# Patient Record
Sex: FEMALE | Race: WHITE | Employment: OTHER | URBAN - METROPOLITAN AREA
[De-identification: names, ages, dates, MRNs, and addresses within clinical notes are randomized per-mention and may not be internally consistent; named-entity substitution may affect disease eponyms.]

---

## 2018-09-27 DIAGNOSIS — R25.1 TREMOR: Primary | ICD-10-CM

## 2018-09-27 PROBLEM — M35.1 MIXED CONNECTIVE TISSUE DISEASE (HCC): Status: ACTIVE | Noted: 2017-09-06

## 2018-09-27 PROBLEM — M32.9 SLE (SYSTEMIC LUPUS ERYTHEMATOSUS) (HCC): Status: ACTIVE | Noted: 2017-09-06

## 2018-09-27 PROBLEM — T14.8XXA BLISTER: Status: ACTIVE | Noted: 2018-09-06

## 2018-09-27 PROBLEM — M12.529: Status: ACTIVE | Noted: 2018-05-11

## 2018-09-27 PROBLEM — D59.10 AUTOIMMUNE HEMOLYTIC ANEMIA (HCC): Status: ACTIVE | Noted: 2018-09-12

## 2018-09-27 PROBLEM — S72.452P: Status: ACTIVE | Noted: 2017-03-02

## 2018-09-27 PROBLEM — S54.02XA INJURY OF LEFT ULNAR NERVE: Status: ACTIVE | Noted: 2018-05-30

## 2018-09-27 PROBLEM — I73.00 RAYNAUD'S DISEASE WITHOUT GANGRENE: Status: ACTIVE | Noted: 2017-06-01

## 2018-09-27 PROBLEM — L98.422 CHRONIC NON-PRESSURE ULCER OF SACRUM EXTENDING TO FAT LEVEL (HCC): Status: ACTIVE | Noted: 2018-09-12

## 2018-10-09 ENCOUNTER — OFFICE VISIT (OUTPATIENT)
Dept: INFECTIOUS DISEASES | Age: 63
End: 2018-10-09
Payer: COMMERCIAL

## 2018-10-09 VITALS
HEART RATE: 86 BPM | RESPIRATION RATE: 16 BRPM | BODY MASS INDEX: 42.11 KG/M2 | OXYGEN SATURATION: 95 % | SYSTOLIC BLOOD PRESSURE: 132 MMHG | HEIGHT: 66 IN | DIASTOLIC BLOOD PRESSURE: 71 MMHG | WEIGHT: 262 LBS

## 2018-10-09 DIAGNOSIS — M32.19 OTHER SYSTEMIC LUPUS ERYTHEMATOSUS WITH OTHER ORGAN INVOLVEMENT (HCC): Primary | ICD-10-CM

## 2018-10-09 DIAGNOSIS — T14.8XXA OPEN WOUND: ICD-10-CM

## 2018-10-09 PROCEDURE — 99203 OFFICE O/P NEW LOW 30 MIN: CPT | Performed by: INTERNAL MEDICINE

## 2018-10-09 RX ORDER — PREDNISONE 20 MG/1
TABLET ORAL
COMMUNITY
Start: 2017-06-14

## 2018-10-09 RX ORDER — GENTAMICIN SULFATE 1 MG/G
OINTMENT TOPICAL
COMMUNITY
Start: 2018-09-30

## 2018-10-09 RX ORDER — FESOTERODINE FUMARATE 8 MG/1
8 TABLET, EXTENDED RELEASE ORAL
COMMUNITY
Start: 2016-05-20

## 2018-10-09 RX ORDER — NITROFURANTOIN 25; 75 MG/1; MG/1
500 CAPSULE ORAL
COMMUNITY

## 2018-10-09 RX ORDER — TIZANIDINE 4 MG/1
4 TABLET ORAL
COMMUNITY
Start: 2018-05-08

## 2018-10-09 NOTE — PROGRESS NOTES
 Peripheral neuropathy 2016    Psychogenic dysuria 2016    Raynaud's disease without gangrene 2017    SLE (systemic lupus erythematosus) ([facility] [address] 2017    Thyroid disease     hypothyroid    Traumatic arthropathy of elbow 2018    Tremor 2016       Past Surgical  History:     Past Surgical History:   Procedure Laterality Date    ABDOMINOPLASTY  2014    BRACHIOPLASTY Bilateral 2014    BREAST SURGERY Bilateral 2013    BILATERAL BREAST LIFT WITH EXCISION OPF AXILLA REDUNDANT TISSUE      SECTION      COSMETIC SURGERY      FRACTURE SURGERY Left     compound fx of humerus and reconstruction of elbow    TONSILLECTOMY      TUBAL LIGATION         Medications:     Current Outpatient Prescriptions:     predniSONE (DELTASONE) 20 MG tablet, take 1 tablet by mouth twice a day, Disp: , Rfl:     tiZANidine (ZANAFLEX) 4 MG tablet, Take 4 mg by mouth, Disp: , Rfl:     Fesoterodine Fumarate ER (TOVIAZ) 8 MG TB24, Take 8 mg by mouth, Disp: , Rfl:     nitrofurantoin, macrocrystal-monohydrate, (MACROBID) 100 MG capsule, Take 500 mg by mouth, Disp: , Rfl:     gentamicin (GARAMYCIN) 0.1 % ointment, APPLY 1 APPLICATION TOPICALLY 3 TIMES DAILY TO WOUNDS AND BLISTERS, COVER WITH GAUZE, Disp: , Rfl:     PROMOGRAN ALETA WOUND DRESSING MATRIX, Apply to ulcerated areas BID, Disp: 5 Package, Rfl: 1    Multiple Vitamins-Minerals (THERAPEUTIC MULTIVITAMIN-MINERALS) tablet, Take 1 tablet by mouth daily. , Disp: , Rfl:     HYDROcodone-acetaminophen (NORCO) 5-325 MG per tablet, Take 1 tablet by mouth every 4 hours as needed for Pain., Disp: 40 tablet, Rfl: 0    scopolamine (TRANSDERM-SCOP) 1.5 MG, Bring patch with you day of procedure, Disp: 1 patch, Rfl: 0    metoclopramide (REGLAN) 10 MG tablet, Take am of procedure with small sip of water, Disp: 1 tablet, Rfl: 0    famotidine (PEPCID) 20 MG tablet, Take am of procedure with sip of water, Disp: 1 tablet, Rfl: 0  

## 2018-10-15 ENCOUNTER — TELEPHONE (OUTPATIENT)
Dept: INFECTIOUS DISEASES | Age: 63
End: 2018-10-15

## 2018-10-24 PROBLEM — S31.829D: Status: ACTIVE | Noted: 2018-10-24

## 2018-10-24 NOTE — PROGRESS NOTES
[facility] - Office Progress Note  Today's Date and Time: 10/25/2018, 5:53 PM    Diagnostic Impression :     1. Systemic lupus erythematosus (SLE) with pericarditis, unspecified SLE type (HCC)    2. Pyoderma gangrenosum    3. Open wound of buttock without complication, left, subsequent encounter    4. Non-healing ulcer of multiple sites, limited to breakdown of skin ([address]      Recommendations   · Awaiting wound biopsy taken on 10/21/2018 to exclude pyoderma gangrenosum  · If pyoderma gangrenosum is found on biopsy, she will require higher levels of steroids and potentially additional immunosuppressive medications  such as biologic immune modifiers. · Will discuss case with Dr. Judit Moss, Plastic surgery, once biopsy results are available. Pt has an appointment with him on Wednesday, 10/31/2018 at 4pm  · Continue with Dakin's solution dressing changes for now; increase to 3 times a day   · Request for increasing wound care to 3 times a day; external referral faxed to [address] [place] [address] on [place].  · For her wound care, patient requires: Biatain silicone 9B8GU, [employer] Abd pad sterile 5x9in, Biatain alginate 4x4in, cloth tape, 4x4 gauze, [employer]rique [employer] nephew no sting skin prep wipes ([id number]) and medicine cotton tipped applicators (ref- [id number])     Chief complaint/reason for consultation:     Chief Complaint   Patient presents with    Follow-up     other systemic lupus erythematosus with other organ involvement     History of Present Illness:   Ashu Dobbins is a 57y.o.-year-old  female who was initially evaluated on 10/9/2018. Patient seen at the request of .       INITIAL HISTORY:     Patient indicated that she developed a facial rash and periodic fevers for several months. She had an extensive workup with the eventual diagnosis of SLE being made about a year ago.     Since then she has been taking prednisone with control of symptoms.  However, she has  Autoimmune hemolytic anemia (HCC) 2018    Blister 2018    Chronic non-pressure ulcer of sacrum extending to fat level ([facility] [address] 2018    Closed comminuted supracondylar fracture of left femur with malunion 3/2/2017    Closed wedge compression fracture of seventh thoracic vertebra ([address] 2016    Depression     Eczema 2016    Gastroesophageal reflux disease 2016    Hyperlipidemia     Hypothyroidism 2016    Injury of left ulnar nerve 2018    Migraine 2016    Mixed anxiety depressive disorder 2016    Mixed connective tissue disease ([address] 2017    Morbid obesity ([address] 2016    Neuropathy     secondary to spine disease    Osteoarthritis of cervical spine 2016    Osteoarthritis of elbow 2016    Osteoarthritis of lumbar spine 2016    Other plastic surgery for unacceptable cosmetic appearance 2014    Peripheral neuropathy 2016    Psychogenic dysuria 2016    Pyoderma gangrenosum     Raynaud's disease without gangrene 2017    SLE (systemic lupus erythematosus) ([facility] [address] 2017    Thyroid disease     hypothyroid    Traumatic arthropathy of elbow 2018    Tremor 2016     Past Surgical  History:     Past Surgical History:   Procedure Laterality Date    ABDOMINOPLASTY  2014    BRACHIOPLASTY Bilateral 2014    BREAST SURGERY Bilateral 2013    BILATERAL BREAST LIFT WITH EXCISION OPF AXILLA REDUNDANT TISSUE      SECTION      COSMETIC SURGERY      FRACTURE SURGERY Left     compound fx of humerus and reconstruction of elbow    TONSILLECTOMY      TUBAL LIGATION       Medications:     Current Outpatient Prescriptions:     predniSONE (DELTASONE) 20 MG tablet, take 1 tablet by mouth twice a day, Disp: , Rfl:     tiZANidine (ZANAFLEX) 4 MG tablet, Take 4 mg by mouth, Disp: , Rfl:     Fesoterodine Fumarate ER (TOVIAZ) 8 MG TB24, Take 8 mg by mouth, Disp: , Rfl:     nitrofurantoin,

## 2018-10-25 ENCOUNTER — OFFICE VISIT (OUTPATIENT)
Dept: INFECTIOUS DISEASES | Age: 63
End: 2018-10-25
Payer: COMMERCIAL

## 2018-10-25 VITALS
DIASTOLIC BLOOD PRESSURE: 76 MMHG | SYSTOLIC BLOOD PRESSURE: 137 MMHG | OXYGEN SATURATION: 97 % | HEIGHT: 66 IN | BODY MASS INDEX: 42.11 KG/M2 | RESPIRATION RATE: 14 BRPM | WEIGHT: 262 LBS | HEART RATE: 85 BPM

## 2018-10-25 DIAGNOSIS — L88 PYODERMA GANGRENOSUM: ICD-10-CM

## 2018-10-25 DIAGNOSIS — M32.12 SYSTEMIC LUPUS ERYTHEMATOSUS (SLE) WITH PERICARDITIS, UNSPECIFIED SLE TYPE (HCC): Primary | ICD-10-CM

## 2018-10-25 DIAGNOSIS — L98.491: ICD-10-CM

## 2018-10-25 DIAGNOSIS — S31.829D: ICD-10-CM

## 2018-10-25 PROCEDURE — 99214 OFFICE O/P EST MOD 30 MIN: CPT | Performed by: INTERNAL MEDICINE

## 2018-10-25 NOTE — LETTER
[facility] [address]  [address] [facility],[address] [address]  Phone: [phone]  Fax: [phone]    PCP: Adrian Grajeda MD   CC providers:  Adrian Grajeda MD  [address]  [address]  [address] [place]  [address]  [address] [address]  [address] [facility]       October 25, 2018       Patient: Deshaun Torres   MR Number: [id number]   YOB: 1955   Date of Visit: 10/25/2018     Dear Eduard Wilcox: Thank you for allowing me to see your patient Ms. Deshaun Torres. Below are the relevant portions of my assessment and plan of care. [facility] - Office Progress Note  Today's Date and Time: 10/25/2018, 4:58 PM    Diagnostic Impression :     1. Systemic lupus erythematosus (SLE) with pericarditis, unspecified SLE type ([facility] [address]    2. Open wound of buttock without complication, left, subsequent encounter      Recommendations ·   Awaiting wound biopsy taken on 10/21/2018 to exclude pyoderma gangrenosum  · If pyoderma gangrenosum is found on biopsy, she will require higher levels of steroids and potentially additional immunosuppressive medications  such as biologic immune modifiers. · Will discuss case with Dr. Vaughn Agrawal, Plastic surgery, once biopsy results are available.  Pt has an appointment with him on Wednesday, 10/31/2018 at 4pm  · Continue with Dakin's solution dressing changes for now; increase to 3 times a day   · Request for increasing wound care to 3 times a day; external referral faxed to [address] [place] [address] on [place].  · For her wound care, patient requires: Biatain silicone 6O4IM, [employer] Abd pad sterile 5x9in, Biatain alginate 4x4in, cloth tape, 4x4 gauze, [employer]rique [employer] nephew no sting skin prep wipes ([id number]) and medicine cotton tipped applicators (ref- [id number])     Chief complaint/reason for consultation:     Chief Complaint   Patient presents with    Follow-up · For her wound care, patient requires: Biatain silicone 6F4CU, [employer] Abd pad sterile 5x9in, Biatain alginate 4x4in, cloth tape, 4x4 gauze, Enrique [employer] nephew no sting skin prep wipes ([id number]) and medicine cotton tipped applicators (ref- [id number])         Discussed with patient, RN, family. I have personally reviewed the past medical history, past surgical history, medications, social history, and family history, and I have updated the database accordingly.   Past Medical History:     Past Medical History:   Diagnosis Date    Anemia 5/30/2016    Asthma     Atopic rhinitis 5/30/2016    Autoimmune hemolytic anemia ([address] 9/12/2018    Blister 9/6/2018    Chronic non-pressure ulcer of sacrum extending to fat level ([address] 9/12/2018    Closed comminuted supracondylar fracture of left femur with malunion 3/2/2017    Closed wedge compression fracture of seventh thoracic vertebra ([address] 5/30/2016    Depression     Eczema 5/30/2016    Gastroesophageal reflux disease 5/30/2016    Hyperlipidemia     Hypothyroidism 5/30/2016    Injury of left ulnar nerve 5/30/2018    Migraine 5/30/2016    Mixed anxiety depressive disorder 5/30/2016    Mixed connective tissue disease ([address] 9/6/2017    Morbid obesity ([address] 5/30/2016    Neuropathy     secondary to spine disease    Osteoarthritis of cervical spine 5/30/2016    Osteoarthritis of elbow 5/30/2016    Osteoarthritis of lumbar spine 5/30/2016    Other plastic surgery for unacceptable cosmetic appearance 1/13/2014    Peripheral neuropathy 5/30/2016    Psychogenic dysuria 5/30/2016    Pyoderma gangrenosum     Raynaud's disease without gangrene 6/1/2017    SLE (systemic lupus erythematosus) ([address] 9/6/2017    Thyroid disease     hypothyroid    Traumatic arthropathy of elbow 5/11/2018    Tremor 5/30/2016     Past Surgical  History:     Past Surgical History:   Procedure Laterality Date    ABDOMINOPLASTY  5/1/2014    BRACHIOPLASTY Bilateral 5/1/2014

## 2018-10-30 ENCOUNTER — TELEPHONE (OUTPATIENT)
Dept: INFECTIOUS DISEASES | Age: 63
End: 2018-10-30

## 2018-11-26 PROBLEM — L88 PYODERMA GANGRENOSUM: Status: ACTIVE | Noted: 2018-11-26

## 2018-11-26 NOTE — PROGRESS NOTES
TONSILLECTOMY      TUBAL LIGATION         Medications:     Current Outpatient Prescriptions:     predniSONE (DELTASONE) 20 MG tablet, take 1 tablet by mouth twice a day, Disp: , Rfl:     tiZANidine (ZANAFLEX) 4 MG tablet, Take 4 mg by mouth, Disp: , Rfl:     Fesoterodine Fumarate ER (TOVIAZ) 8 MG TB24, Take 8 mg by mouth, Disp: , Rfl:     nitrofurantoin, macrocrystal-monohydrate, (MACROBID) 100 MG capsule, Take 500 mg by mouth, Disp: , Rfl:     gentamicin (GARAMYCIN) 0.1 % ointment, APPLY 1 APPLICATION TOPICALLY 3 TIMES DAILY TO WOUNDS AND BLISTERS, COVER WITH GAUZE, Disp: , Rfl:     PROMOGRAN ALETA WOUND DRESSING MATRIX, Apply to ulcerated areas BID, Disp: 5 Package, Rfl: 1    Multiple Vitamins-Minerals (THERAPEUTIC MULTIVITAMIN-MINERALS) tablet, Take 1 tablet by mouth daily. , Disp: , Rfl:     HYDROcodone-acetaminophen (NORCO) 5-325 MG per tablet, Take 1 tablet by mouth every 4 hours as needed for Pain., Disp: 40 tablet, Rfl: 0    scopolamine (TRANSDERM-SCOP) 1.5 MG, Bring patch with you day of procedure, Disp: 1 patch, Rfl: 0    metoclopramide (REGLAN) 10 MG tablet, Take am of procedure with small sip of water, Disp: 1 tablet, Rfl: 0    famotidine (PEPCID) 20 MG tablet, Take am of procedure with sip of water, Disp: 1 tablet, Rfl: 0    amoxicillin-clavulanate (AUGMENTIN) 875-125 MG per tablet, , Disp: , Rfl:     ondansetron (ZOFRAN) 4 MG tablet, Take 1 tablet by mouth every 8 hours as needed for Nausea for 15 doses. , Disp: 15 tablet, Rfl: 2    fluticasone-salmeterol (ADVAIR) 250-50 MCG/DOSE AEPB, Inhale 1 puff into the lungs every 12 hours. Take as directed, Disp: , Rfl:     buPROPion (WELLBUTRIN XL) 150 MG XL tablet, Take 150 mg by mouth every morning., Disp: , Rfl:     clonazePAM (KLONOPIN) 1 MG tablet, Take 1 mg by mouth daily. , Disp: , Rfl:     pregabalin (LYRICA) 100 MG capsule, Take 100 mg by mouth 2 times daily.  225mg daily, Disp: , Rfl:     naproxen (NAPROSYN) 500 MG tablet, Take 500 mg General Appearance: Awake, alert, and in no apparent distress  Head:  Normocephalic, no trauma  Eyes: Pupils equal, round, reactive, to light and accommodation; extraocular movements intact; sclera anicteric; conjunctivae pink. No embolic phenomena. ENT: Oropharynx clear, without erythema, exudate, or thrush. No tenderness of sinuses. Mouth/throat: mucosa pink and moist. No lesions. Dentition in good repair. Neck:Supple, without lymphadenopathy. Thyroid normal, No bruits. Pulmonary/Chest: Clear to auscultation, without wheezes, rales, or rhonchi. No dullness to percussion. Cardiovascular: Regular rate and rhythm without murmurs, rubs, or gallops. Abdomen: Soft, non tender. Bowel sounds normal. No organomegaly  All four Extremities: No cyanosis, clubbing, edema, or effusions. Neurologic: No gross sensory or motor deficits. Skin: Warm and dry with good turgor. No signs of peripheral arterial or venous insufficiency. Medical Decision Making:   I have independently reviewed/ordered the following labs:    CBC with Differential:   Lab Results   Component Value Date    WBC 8.5 04/23/2014    HGB 13.9 04/23/2014    HCT 40.8 04/23/2014     04/23/2014     BMP:   Lab Results   Component Value Date     04/23/2014    K 3.8 04/23/2014    CL 97 04/23/2014    CO2 25 04/23/2014     Lab Results   Component Value Date    RBC 4.43 04/23/2014    WBC 8.5 04/23/2014     No results found for: CREATININE, GLUCOSE, KETONES    Thank you for allowing us to participate in the care of this patient. Please call with questions.     Sampson Vaughn MD  Pager: [phone] - Office: [phone]

## 2018-11-27 ENCOUNTER — OFFICE VISIT (OUTPATIENT)
Dept: INFECTIOUS DISEASES | Age: 63
End: 2018-11-27

## 2018-11-27 DIAGNOSIS — F41.8 MIXED ANXIETY DEPRESSIVE DISORDER: ICD-10-CM

## 2018-11-27 DIAGNOSIS — S31.829D: Primary | ICD-10-CM

## 2018-11-27 DIAGNOSIS — D59.10 AUTOIMMUNE HEMOLYTIC ANEMIA (HCC): ICD-10-CM

## 2018-11-27 DIAGNOSIS — M35.1 MIXED CONNECTIVE TISSUE DISEASE (HCC): ICD-10-CM

## 2018-11-27 DIAGNOSIS — L88 PYODERMA GANGRENOSUM: ICD-10-CM

## 2018-11-27 DIAGNOSIS — M32.12 SYSTEMIC LUPUS ERYTHEMATOSUS (SLE) WITH PERICARDITIS, UNSPECIFIED SLE TYPE (HCC): ICD-10-CM

## 2018-11-27 DIAGNOSIS — E66.01 MORBID OBESITY (HCC): ICD-10-CM

## 2019-12-10 PROBLEM — Z41.1 ELECTIVE PROCEDURE FOR UNACCEPTABLE COSMETIC APPEARANCE: Status: ACTIVE | Noted: 2019-12-10

## 2020-03-12 LAB — DIABETIC RETINOPATHY: NEGATIVE

## 2022-08-30 ENCOUNTER — APPOINTMENT (OUTPATIENT)
Dept: CT IMAGING | Facility: CLINIC | Age: 67
End: 2022-08-30
Payer: MEDICARE

## 2022-08-30 ENCOUNTER — HOSPITAL ENCOUNTER (EMERGENCY)
Facility: CLINIC | Age: 67
Discharge: HOME | End: 2022-08-30
Attending: EMERGENCY MEDICINE
Payer: MEDICARE

## 2022-08-30 VITALS
HEART RATE: 82 BPM | SYSTOLIC BLOOD PRESSURE: 162 MMHG | BODY MASS INDEX: 36 KG/M2 | HEIGHT: 66 IN | OXYGEN SATURATION: 95 % | TEMPERATURE: 99 F | DIASTOLIC BLOOD PRESSURE: 77 MMHG | RESPIRATION RATE: 16 BRPM | WEIGHT: 224 LBS

## 2022-08-30 DIAGNOSIS — U07.1 COVID: Primary | ICD-10-CM

## 2022-08-30 LAB
ABSOLUTE EOS #: 0 K/UL (ref 0–0.4)
ABSOLUTE LYMPH #: 0.6 K/UL (ref 1–4.8)
ABSOLUTE MONO #: 0.4 K/UL (ref 0.1–1.2)
ALBUMIN SERPL-MCNC: 3.7 G/DL (ref 3.5–5.2)
ALBUMIN/GLOBULIN RATIO: 1.5 (ref 1–2.5)
ALP BLD-CCNC: 61 U/L (ref 35–104)
ALT SERPL-CCNC: 6 U/L (ref 5–33)
ANION GAP SERPL CALCULATED.3IONS-SCNC: 13 MMOL/L (ref 9–17)
AST SERPL-CCNC: 10 U/L
BASOPHILS # BLD: 0 % (ref 0–2)
BASOPHILS ABSOLUTE: 0 K/UL (ref 0–0.2)
BILIRUB SERPL-MCNC: 0.4 MG/DL (ref 0.3–1.2)
BUN BLDV-MCNC: 10 MG/DL (ref 8–23)
CALCIUM SERPL-MCNC: 8.7 MG/DL (ref 8.6–10.4)
CHLORIDE BLD-SCNC: 99 MMOL/L (ref 98–107)
CO2: 21 MMOL/L (ref 20–31)
CREAT SERPL-MCNC: 0.6 MG/DL (ref 0.5–0.9)
D-DIMER QUANTITATIVE: 2.15 MG/L FEU
EOSINOPHILS RELATIVE PERCENT: 0 % (ref 1–4)
GFR AFRICAN AMERICAN: >60 ML/MIN
GFR NON-AFRICAN AMERICAN: >60 ML/MIN
GFR SERPL CREATININE-BSD FRML MDRD: ABNORMAL ML/MIN/{1.73_M2}
GLUCOSE BLD-MCNC: 110 MG/DL (ref 70–99)
HCT VFR BLD CALC: 29.2 % (ref 36–46)
HEMOGLOBIN: 9.7 G/DL (ref 12–16)
LYMPHOCYTES # BLD: 12 % (ref 24–44)
MCH RBC QN AUTO: 30.8 PG (ref 26–34)
MCHC RBC AUTO-ENTMCNC: 33.3 G/DL (ref 31–37)
MCV RBC AUTO: 92.7 FL (ref 80–100)
MONOCYTES # BLD: 8 % (ref 2–11)
PDW BLD-RTO: 15.3 % (ref 12.5–15.4)
PLATELET # BLD: 223 K/UL (ref 140–450)
PMV BLD AUTO: 6.4 FL (ref 6–12)
POTASSIUM SERPL-SCNC: 3.4 MMOL/L (ref 3.7–5.3)
RBC # BLD: 3.15 M/UL (ref 4–5.2)
SEG NEUTROPHILS: 80 % (ref 36–66)
SEGMENTED NEUTROPHILS ABSOLUTE COUNT: 3.6 K/UL (ref 1.8–7.7)
SODIUM BLD-SCNC: 133 MMOL/L (ref 135–144)
TOTAL PROTEIN: 6.2 G/DL (ref 6.4–8.3)
TROPONIN, HIGH SENSITIVITY: 23 NG/L (ref 0–14)
WBC # BLD: 4.5 K/UL (ref 3.5–11)

## 2022-08-30 PROCEDURE — 6370000000 HC RX 637 (ALT 250 FOR IP): Performed by: EMERGENCY MEDICINE

## 2022-08-30 PROCEDURE — 93005 ELECTROCARDIOGRAM TRACING: CPT | Performed by: EMERGENCY MEDICINE

## 2022-08-30 PROCEDURE — 84484 ASSAY OF TROPONIN QUANT: CPT

## 2022-08-30 PROCEDURE — 36415 COLL VENOUS BLD VENIPUNCTURE: CPT

## 2022-08-30 PROCEDURE — 2580000003 HC RX 258: Performed by: EMERGENCY MEDICINE

## 2022-08-30 PROCEDURE — 85025 COMPLETE CBC W/AUTO DIFF WBC: CPT

## 2022-08-30 PROCEDURE — 99285 EMERGENCY DEPT VISIT HI MDM: CPT

## 2022-08-30 PROCEDURE — 6360000004 HC RX CONTRAST MEDICATION: Performed by: EMERGENCY MEDICINE

## 2022-08-30 PROCEDURE — 80053 COMPREHEN METABOLIC PANEL: CPT

## 2022-08-30 PROCEDURE — 71260 CT THORAX DX C+: CPT | Performed by: EMERGENCY MEDICINE

## 2022-08-30 PROCEDURE — 85379 FIBRIN DEGRADATION QUANT: CPT

## 2022-08-30 RX ORDER — BENZONATATE 100 MG/1
100 CAPSULE ORAL 3 TIMES DAILY PRN
Qty: 30 CAPSULE | Refills: 0 | Status: SHIPPED | OUTPATIENT
Start: 2022-08-30 | End: 2022-09-06

## 2022-08-30 RX ORDER — ALBUTEROL SULFATE 90 UG/1
2 AEROSOL, METERED RESPIRATORY (INHALATION) ONCE
Status: COMPLETED | OUTPATIENT
Start: 2022-08-30 | End: 2022-08-30

## 2022-08-30 RX ORDER — 0.9 % SODIUM CHLORIDE 0.9 %
1000 INTRAVENOUS SOLUTION INTRAVENOUS ONCE
Status: COMPLETED | OUTPATIENT
Start: 2022-08-30 | End: 2022-08-30

## 2022-08-30 RX ORDER — ACETAMINOPHEN 500 MG
1000 TABLET ORAL ONCE
Status: COMPLETED | OUTPATIENT
Start: 2022-08-30 | End: 2022-08-30

## 2022-08-30 RX ORDER — 0.9 % SODIUM CHLORIDE 0.9 %
70 INTRAVENOUS SOLUTION INTRAVENOUS ONCE
Status: COMPLETED | OUTPATIENT
Start: 2022-08-30 | End: 2022-08-30

## 2022-08-30 RX ORDER — BENZONATATE 100 MG/1
100 CAPSULE ORAL ONCE
Status: COMPLETED | OUTPATIENT
Start: 2022-08-30 | End: 2022-08-30

## 2022-08-30 RX ADMIN — SODIUM CHLORIDE 70 ML: 9 INJECTION, SOLUTION INTRAVENOUS at 21:28

## 2022-08-30 RX ADMIN — IOPAMIDOL 75 ML: 755 INJECTION, SOLUTION INTRAVENOUS at 21:28

## 2022-08-30 RX ADMIN — ALBUTEROL SULFATE 2 PUFF: 108 AEROSOL, METERED RESPIRATORY (INHALATION) at 20:00

## 2022-08-30 RX ADMIN — SODIUM CHLORIDE 1000 ML: 9 INJECTION, SOLUTION INTRAVENOUS at 20:08

## 2022-08-30 RX ADMIN — ACETAMINOPHEN 1000 MG: 500 TABLET ORAL at 20:00

## 2022-08-30 RX ADMIN — BENZONATATE 100 MG: 100 CAPSULE ORAL at 20:00

## 2022-08-30 NOTE — ED PROVIDER NOTES
Reason For Visit  ARIANNE RODRIGUEZ is an established patient here today for a follow-up management of constipation.   :  services not used.   A chaperone is not applicable. He is unaccompanied.        Quality    Adult Wellness CI height documented, discussion of regular exercise, exercising regularly, printed information given for activities, discussion of nutritional quality of diet, patient education given about proper diet, alcohol use, not having considered quitting drinking, not getting angry when talked to about drinking, not having a drink or two in the morning to get going, not drinking alcohol regularly, and feeling guilty about it, no tobacco use, does not have feelings of hopelessness (PHQ-2), no Anhedonia (PHQ-2), monitoring patient and pain scale level reviewed.      History of Present Illness  Patient attempted to try MiraLAX. Minimal relief. Patient continues to have change in caliber of stool more small in caliber. Patient feels as though he may have obstruction of his lower GI tract. Patient's right upper quadrant abdominal pain has mildly improved.      Review of Systems    All other systems reviewed and negative.      Current Meds   1. Butalbital-APAP-Caff-Cod -40-30 MG Oral Capsule;   Therapy: 12Dec2017 to (Evaluate:01Jan2018); Last Rx:[id number] Ordered   2. CoQ10 200 MG Oral Capsule; 1 TABLET TWICE DAILY;   Therapy: 12Dec2017 to (Evaluate:12Mar2018)  Requested for: 12Dec2017; Last   Rx:12Dec2017 Ordered   3. L-Carnitine 500 MG Oral Tablet; 2 Tablets by mouth twice daily;   Therapy: [id number] to (Evaluate:12Mar2018)  Requested for: [id number]; Last   Rx:[id number] Ordered   4. Naproxen 500 MG Oral Tablet; TAKE 1 TABLET EVERY 12 HOURS AS NEEDED FOR   PAIN with food;   Therapy: 06Nov2018 to (Evaluate:05Jan2019)  Requested for: 06Nov2018; Last   Rx:06Nov2018 Ordered   5. Ondansetron 4 MG Oral Tablet Disintegrating; Take one- two tablet on tongue every 8    hours allow to  [facility]  [facility]  Phone: [phone]      Pt Name: Nahomi Metzger  [id number]  [place] 1955  Date of evaluation: 8/30/2022      CHIEF COMPLAINT       Chief Complaint   Patient presents with    Cough    Positive For Covid-19       HISTORY OF PRESENT ILLNESS   Nahomi Metzger is a 74 y.o. female who presents for evaluation for a persistent cough, fever and upper respiratory symptoms. The patient reports that she developed a nonproductive cough, sore throat, myalgias, fatigue, and intermittent fever 13 days ago and tested positive for COVID the same day. She states that she had a few days where she felt improved and thought that she had gotten over the infection. The patient called her PCP after approximately 1 week of symptoms and requested to be placed on Paxil of it but she did not qualify because she was over 5 days into her course. The patient has been taking Tylenol for fever but has not taken this for several days. She has been taking cough drops and over-the-counter cough medication for her she states that she was concerned that she was still having symptoms today which prompted her to call EMS and have her brought to the emergency department. The patient does not list any provoking or palliating factors. She is a poor historian regarding her past medical history. The patient denies vision changes, headache, neck pain, back pain, chest pain, shortness of breath, dizziness, lightheadedness, nausea, vomiting, urinary/bowel symptoms, focal weakness, numbness, tingling, recent injury or illness.     REVIEW OF SYSTEMS     Ten point review of systems was reviewed and is negative unless otherwise noted in the HPI    [address]    has a past medical history of Anemia, Asthma, Atopic rhinitis, Autoimmune hemolytic anemia ([address] Blister, Chronic non-pressure ulcer of sacrum extending to fat level ([address] Closed comminuted supracondylar fracture of left dissolve then swallow as needed for nausea  Requested for: 06Nov2018;   Last Rx:06Nov2018 Ordered   6. Polyethylene Glycol 3350 Oral Powder; MIX 1 CAPFUL IN 8 OUNCES OF WATER AND   DRINK AT BEDTIME AS NEEDED FOR CONSTIPATION;   Therapy: 06Nov2018 to (Evaluate:01Nov2019)  Requested for: 06Nov2018; Last   Rx:06Nov2018 Ordered   7. TraMADol HCl - 50 MG Oral Tablet; TAKE 1 TABLET BY MOUTH EVERY 12 HOURS AS   NEEDED FOR PAIN;   Therapy: 06Nov2018 to (Evaluate:16Nov2018); Last Rx:06Nov2018 Ordered    Active Problems  Colicky right upper quadrant pain (R10.11)  Constipation (K59.00)  Gallstones (K80.20)  Migraine with typical aura (G43.109)  Need for influenza vaccination (Z23)    Past Medical History  History of attention deficit disorder (Z86.59)  History of bruxism    Surgical History  no history of surgery    Family History  Mother   No pertinent family history  Father   No pertinent family history  Brother   Family history of migraine headaches (Z82.0)    Social History  Minimum alcohol consumption  Monogamous relationship  Never smoker  No illicit drug use  Occupation   · 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      Vitals  Signs   Recorded: 09Nov2018 03:22PM   Height: 5 ft 9 in  Weight: 196 lb   BMI Calculated: 28.94  BSA Calculated: 2.05  Systolic: 120  Diastolic: 60  Temperature: 97.1 F  Heart Rate: 59  Respiration: 17  O2 Saturation: 98  Pain Scale: 00    Physical Exam  Constitutional: alert, in no acute distress and current vital signs reviewed.   Head and Face: atraumatic, no deformities, normocephalic, normal facies.   Eyes: no discharge, normal conjunctiva, no eyelid swelling, no ptosis and the sclerae were normal. pupils equal, round and reactive to light and accommodation, conjugate gaze and extraocular movements were intact.   Pulmonary: no respiratory distress, normal  femur with malunion, Closed wedge compression fracture of seventh thoracic vertebra (HCC), Depression, Eczema, Gastroesophageal reflux disease, Hyperlipidemia, Hypothyroidism, Injury of left ulnar nerve, Migraine, Mixed anxiety depressive disorder, Mixed connective tissue disease ([address] Morbid obesity ([address] Neuropathy, Osteoarthritis of cervical spine, Osteoarthritis of elbow, Osteoarthritis of lumbar spine, Other plastic surgery for unacceptable cosmetic appearance, Peripheral neuropathy, Psychogenic dysuria, Pyoderma gangrenosum, Raynaud's disease without gangrene, SLE (systemic lupus erythematosus) ([address] Thyroid disease, Traumatic arthropathy of elbow, and Tremor. SURGICAL HISTORY      has a past surgical history that includes Tonsillectomy; fracture surgery (Left, );  section; Tubal ligation; Breast surgery (Bilateral, 2013); Cosmetic surgery; Abdominoplasty (2014); and Brachioplasty (Bilateral, 2014). CURRENT MEDICATIONS       Discharge Medication List as of 2022 10:19 PM        CONTINUE these medications which have NOT CHANGED    Details   predniSONE (DELTASONE) 20 MG tablet take 1 tablet by mouth twice a dayHistorical Med      tiZANidine (ZANAFLEX) 4 MG tablet Take 4 mg by mouthHistorical Med      Fesoterodine Fumarate ER (TOVIAZ) 8 MG TB24 Take 8 mg by mouthHistorical Med      nitrofurantoin, macrocrystal-monohydrate, (MACROBID) 100 MG capsule Take 500 mg by mouthHistorical Med      gentamicin (GARAMYCIN) 0.1 % ointment APPLY 1 APPLICATION TOPICALLY 3 TIMES DAILY TO WOUNDS AND BLISTERS, COVER WITH GAUZE, Historical Med      PROMOGRAN ALETA WOUND DRESSING MATRIX Disp-5 Package, R-1, PrintApply to ulcerated areas BID      Multiple Vitamins-Minerals (THERAPEUTIC MULTIVITAMIN-MINERALS) tablet Take 1 tablet by mouth daily.       HYDROcodone-acetaminophen (NORCO) 5-325 MG per tablet Take 1 tablet by mouth every 4 hours as needed for Pain., Disp-40 tablet, R-0      scopolamine (TRANSDERM-SCOP) 1.5 MG Bring patch with you day of procedure, Disp-1 patch, R-0      metoclopramide (REGLAN) 10 MG tablet Take am of procedure with small sip of water, Disp-1 tablet, R-0      famotidine (PEPCID) 20 MG tablet Take am of procedure with sip of water, Disp-1 tablet, R-0      amoxicillin-clavulanate (AUGMENTIN) 875-125 MG per tablet Historical Med      ondansetron (ZOFRAN) 4 MG tablet Take 1 tablet by mouth every 8 hours as needed for Nausea for 15 doses. , Disp-15 tablet, R-2      fluticasone-salmeterol (ADVAIR) 250-50 MCG/DOSE AEPB Inhale 1 puff into the lungs every 12 hours. Take as directed      buPROPion (WELLBUTRIN XL) 150 MG XL tablet Take 150 mg by mouth every morning. clonazePAM (KLONOPIN) 1 MG tablet Take 1 mg by mouth daily. pregabalin (LYRICA) 100 MG capsule Take 100 mg by mouth 2 times daily. 225mg daily      naproxen (NAPROSYN) 500 MG tablet Take 500 mg by mouth as needed for Pain. PARoxetine (PAXIL) 40 MG tablet Take 40 mg by mouth every morning. 2 tablets by mouth in the morning      primidone (MYSOLINE) 50 MG tablet Take 50 mg by mouth daily. 3 tablets by mouth daily      montelukast (SINGULAIR) 10 MG tablet Take 10 mg by mouth as needed. levothyroxine (SYNTHROID) 175 MCG tablet Take 175 mcg by mouth Daily. topiramate (TOPAMAX) 25 MG tablet Take 25 mg by mouth 2 times daily. 50 mg every 12 hours             ALLERGIES     has No Known Allergies. FAMILY HISTORY     has no family status information on file. family history is not on file. SOCIAL HISTORY      reports that she has never smoked. She has never used smokeless tobacco. She reports current alcohol use. She reports that she does not use drugs. PHYSICAL EXAM     INITIAL VITALS:  height is 5' 6\" (1.676 m) and weight is 101.6 kg (224 lb). Her temperature is 99 °F (37.2 °C). Her blood pressure is 162/77 (abnormal) and her pulse is 82. Her respiration is 16 and oxygen saturation is 95%. respiratory rate and effort and no accessory muscle use. breath sounds clear to auscultation bilaterally.   Cardiovascular: normal rate, no murmurs were heard, regular rhythm, normal S1 and normal S2. edema was not present in the lower extremities.   Abdomen: soft, nondistended, normal bowel sounds and no abdominal mass . Mild tenderness with palpation of right upper quadrant and left lower quadrant. no hepatomegaly and no splenomegaly. no umbilical hernia was discovered. the rectum was normal. had no hard area or nodule.      Immunizations  Influenza --- Series1: 12-Dec-2017  (33y)     Assessment  Colicky right upper quadrant pain (R10.11)  Gallstones (K80.20)  Constipation (K59.00)  Change in stool caliber (R19.4)    Plan  CT ABDOMEN AND PELVIS W CON; Status:Active - Perform Order; Requested  for:09Nov2018;   Gastroenterology Referral/Consult Treatment and Evaluation    Status: Active  Requested  for: 09Nov2018  Care Summary provided. : Yes  Plans:     Plan:   Follow-up with GI and general surgeon have abdominal ultrasound and CT performed.   Follow-up in the office as needed.   Counseled on alcohol usage & associated health risks & benefits.    Counseled on illicit drug usage & associated health risks.    Counseled on tobacco education.    Medical compliance with plan discussed and risks of non-compliance reviewed.    Patient education completed on disease process, etiology & prognosis.    Patient expresses understanding of the plan.    Proper usage and side effects of medications reviewed & discussed.    Refer to orders.    Return to clinic as clinically indicated as discussed with patient who verbalized understanding of & agreement with the plan.    Written handout given.      Discussion/Summary  Colicky right upper quadrant abdominal pain with positive gallstones on ultrasound  Repeat ultrasound to rule out obstruction  Symptomatic gallstones  Refer patient to general surgeon and repeat ultrasound  Labs  CONSTITUTIONAL: no apparent distress, well appearing  SKIN: warm, dry, no jaundice, hives or petechiae  EYES: clear conjunctiva, non-icteric sclera  HENT: normocephalic, atraumatic, moist mucus membranes, Posterior oropharynx is clear without any erythema, edema, exudate or asymmetry. Uvula midline. No trismus or malocclusion. No pain to palpation over the frontal or maxillary sinuses. No mastoid tenderness. Able to handle secretions. Normal speech. Patent airway. No submental swelling or evidence of cellulitis. NECK: Nontender and supple with no nuchal rigidity, full range of motion  PULMONARY: clear to auscultation without wheezes, rhonchi, or rales, normal excursion, no accessory muscle use and no stridor  CARDIOVASCULAR: tachycardiac rate, regular rhythm. Strong radial pulses with intact distal perfusion. Capillary refill <2 seconds. GASTROINTESTINAL: soft, non-tender, non-distended, no palpable masses, no rebound or guarding   GENITOURINARY: No costovertebral angle tenderness to palpation  MUSCULOSKELETAL: No midline spinal tenderness, step off or deformity. Extremities are otherwise nontender to palpation and nonerythematous. Compartments soft. No peripheral edema. NEUROLOGIC: alert and oriented x 3, GCS 15, normal mentation and speech.  Moves all extremities x 4 without motor or sensory deficit, gait is stable without ataxia  PSYCHIATRIC: normal mood and affect, thought process is clear and linear    DIAGNOSTIC RESULTS     EKG:  EKG 1957 sinus tachycardia, rate 103 bpm, leftward axis, normal intervals, no ST elevation or depression, T wave inversion in 3, good R wave progression, Q wave in aVR and 3, no previous EKG for comparison    RADIOLOGY:   CT CHEST PULMONARY EMBOLISM W CONTRAST    Result Date: 8/30/2022  EXAMINATION: CTA OF THE CHEST 8/30/2022 9:08 pm TECHNIQUE: CTA of the chest was performed after the administration of intravenous contrast.  Multiplanar reformatted images are provided for unremarkable.  Negative rebound or rigidity negative acute abdomen  Return to clinic if symptoms worsen or do not improve  Tramadol for severe pain  Naproxen as needed for pain    Constipation with stool caliber change in left lower abdominal pain  No improvement after stopping Reglan as well as using MiraLAX  Rectal exam unremarkable.  Will perform CT to rule out any mass  Follow-up with GI for colonoscopy patient   Zofran as needed for nausea  MiraLAX as needed for constipation      will not be charged for today's visit since. Since patient was recently seen in symptoms have not improved.         Signatures   Electronically signed by : Janette Lyle, ; Nov 9 2018  3:20PM CST (Co-author)    Electronically signed by : JESENIA BERG D.O.; Nov 9 2018  3:48PM CST     review. MIP images are provided for review. Automated exposure control, iterative reconstruction, and/or weight based adjustment of the mA/kV was utilized to reduce the radiation dose to as low as reasonably achievable. COMPARISON: None. HISTORY: ORDERING SYSTEM PROVIDED HISTORY: shortness of breath, tachy, elevated d-dimer, COVID positive TECHNOLOGIST PROVIDED HISTORY: shortness of breath, tachy, elevated d-dimer, COVID positive Decision Support Exception - unselect if not a suspected or confirmed emergency medical condition->Emergency Medical Condition (MA) Reason for Exam: COVID positive. D dimer 2.15. Pt could not raise her left arm up for scan. Had difficulty also following breathing instructions FINDINGS: Pulmonary Arteries: Pulmonary arteries are adequately opacified for evaluation. No evidence of intraluminal filling defect to suggest pulmonary embolism. Main pulmonary artery is normal in caliber. Mediastinum: No evidence of mediastinal lymphadenopathy. The heart and pericardium demonstrate no acute abnormality. There is no acute abnormality of the thoracic aorta. Small axial hiatal hernia. Lungs/pleura: Scattered areas of ground-glass densities noted throughout both lungs, most likely suggestive of moderate burden of multifocal pneumonia as seen with COVID. No pulmonary edema. No evidence of pleural effusion or pneumothorax. Upper Abdomen: Limited images of the upper abdomen are unremarkable. Soft Tissues/Bones: Old fracture deformity of posterior aspect of right 8 rib. No other acute bone or soft tissue abnormality. No evidence of pulmonary embolism. Scattered areas of ground-glass densities noted throughout both lungs, most likely suggestive of moderate burden of multifocal pneumonia as seen with COVID. Small axial hiatal hernia.  RECOMMENDATIONS: Unavailable        LABS:  Results for orders placed or performed during the hospital encounter of 08/30/22   CBC with Auto Differential   Result Value Ref Range    WBC 4.5 3.5 - 11.0 k/uL    RBC 3.15 (L) 4.0 - 5.2 m/uL    Hemoglobin 9.7 (L) 12.0 - 16.0 g/dL    Hematocrit 29.2 (L) 36 - 46 %    MCV 92.7 80 - 100 fL    MCH 30.8 26 - 34 pg    MCHC 33.3 31 - 37 g/dL    RDW 15.3 12.5 - 15.4 %    Platelets 485 333 - 420 k/uL    MPV 6.4 6.0 - 12.0 fL    Seg Neutrophils 80 (H) 36 - 66 %    Lymphocytes 12 (L) 24 - 44 %    Monocytes 8 2 - 11 %    Eosinophils % 0 (L) 1 - 4 %    Basophils 0 0 - 2 %    Segs Absolute 3.60 1.8 - 7.7 k/uL    Absolute Lymph # 0.60 (L) 1.0 - 4.8 k/uL    Absolute Mono # 0.40 0.1 - 1.2 k/uL    Absolute Eos # 0.00 0.0 - 0.4 k/uL    Basophils Absolute 0.00 0.0 - 0.2 k/uL   D-Dimer, Quantitative   Result Value Ref Range    D-Dimer, Quant 2.15 mg/L FEU   Troponin   Result Value Ref Range    Troponin, High Sensitivity 23 (H) 0 - 14 ng/L   Comprehensive Metabolic Panel   Result Value Ref Range    Glucose 110 (H) 70 - 99 mg/dL    BUN 10 8 - 23 mg/dL    Creatinine 0.60 0.50 - 0.90 mg/dL    Calcium 8.7 8.6 - 10.4 mg/dL    Sodium 133 (L) 135 - 144 mmol/L    Potassium 3.4 (L) 3.7 - 5.3 mmol/L    Chloride 99 98 - 107 mmol/L    CO2 21 20 - 31 mmol/L    Anion Gap 13 9 - 17 mmol/L    Alkaline Phosphatase 61 35 - 104 U/L    ALT 6 5 - 33 U/L    AST 10 <32 U/L    Total Bilirubin 0.40 0.3 - 1.2 mg/dL    Total Protein 6.2 (L) 6.4 - 8.3 g/dL    Albumin 3.7 3.5 - 5.2 g/dL    Albumin/Globulin Ratio 1.5 1.0 - 2.5    GFR Non-African American >60 >60 mL/min    GFR African American >60 >60 mL/min    GFR Comment             EMERGENCY DEPARTMENT COURSE:        The patient was given the following medications:  Orders Placed This Encounter   Medications    0.9 % sodium chloride bolus    albuterol sulfate HFA (PROVENTIL;VENTOLIN;PROAIR) 108 (90 Base) MCG/ACT inhaler 2 puff     Order Specific Question:   Initiate RT Bronchodilator Protocol     Answer:   Yes - ED protocol    benzonatate (TESSALON) capsule 100 mg    acetaminophen (TYLENOL) tablet 1,000 mg    0.9 % sodium chloride bolus    iopamidol (ISOVUE-370) 76 % injection 75 mL    benzonatate (TESSALON PERLES) 100 MG capsule     Sig: Take 1 capsule by mouth 3 times daily as needed for Cough     Dispense:  30 capsule     Refill:  0        Vitals:    Vitals:    08/30/22 1906 08/30/22 2218 08/30/22 2239   BP: (!) 177/76 (!) 162/77    Pulse: (!) 107 82    Resp: 16     Temp: (!) 102.1 °F (38.9 °C) 99 °F (37.2 °C)    TempSrc: Oral  Oral   SpO2: 95%     Weight: 101.6 kg (224 lb)     Height: 5' 6\" (1.676 m)       -------------------------  BP: (!) 162/77, Temp: 99 °F (37.2 °C), Heart Rate: 82, Resp: 16    CONSULTS:  None    CRITICAL CARE:   None    PROCEDURES:  None    DIAGNOSIS/ MDM:   Yolande Sorensen is a 74 y.o. female who presents with persistent after being diagnosed with COVID 13 days ago. She arrives febrile and tachycardic. She defervesced with Tylenol and heart rate improved with IV fluids. Her cough improved with Tessalon Perles and a albuterol inhaler. Exam is grossly unremarkable other than tachycardia. CBC shows normocytic anemia with hemoglobin of 9.7. Blood work from [employer] [facility] was reviewed and this appears to be her baseline hemoglobin. CMP is unremarkable. Troponin is 23 which only slightly elevated and this is consistent with her troponin levels in the past.  D-dimer is elevated but CT pulmonary embolism study shows no evidence of PE.  CT scan does show scattered areas of groundglass densities noted throughout both lungs that is most suggestive of moderate burden of multifocal pneumonia as seen with COVID. She also has a small axial hiatal hernia. The patient overall improved with treatment. I have low suspicion for ACS, PE, dissection, pneumothorax, cardiac tamponade, esophageal rupture, or sepsis. I instructed the patient to place a humidifier in her room and try eating honey to help with her sore throat. I provided her with a albuterol inhaler to take home with a spacer and she was prescribed Tessalon Perles. She was instructed to follow-up with her PCP in 1 to 2 days and to return to the ER for worsening symptoms or any other concern. I did recommend that she get a home pulse oximeter to monitor her home oxygen. She was given return precautions. The patient understands that at this time there is no evidence for a more malignant underlying process, but also understands that early in the process of an illness or injury, an emergency department work-up can be falsely reassuring. Routine discharge counseling was given, and the patient understands that worsening, changing or persistent symptoms should prompt a immediate call or follow-up with their primary care physician or return to the emergency department. The importance of appropriate follow-up was also discussed. I have reviewed the disposition diagnosis with the patient. I have answered their questions and given discharge instructions. They voiced understanding of these instructions and did not have any further questions or complaints.       FINAL IMPRESSION      1. COVID          DISPOSITION/PLAN   DISPOSITION Decision To Discharge 08/30/2022 10:18:27 PM        PATIENT REFERRED TO:  Yvette Jessica MD  [address]  [phone]    Schedule an appointment as soon as possible for a visit in 2 days      [facility]  [address] [place] [address],[facility] [address] [phone]  Go to   If symptoms worsen    DISCHARGE MEDICATIONS:  Discharge Medication List as of 8/30/2022 10:19 PM        START taking these medications    Details   benzonatate (TESSALON PERLES) 100 MG capsule Take 1 capsule by mouth 3 times daily as needed for Cough, Disp-30 capsule, R-0Normal             (Please note that portions of this note were completed with a voice recognitionprogram.  Efforts were made to edit the dictations but occasionally words are mis-transcribed.)    Ana Dodge DO, [facility]  Emergency Physician Attending          Ana Dodge DO  08/31/22 7344

## 2022-08-31 ENCOUNTER — CARE COORDINATION (OUTPATIENT)
Dept: CARE COORDINATION | Age: 67
End: 2022-08-31

## 2022-08-31 LAB
EKG ATRIAL RATE: 103 BPM
EKG P AXIS: 44 DEGREES
EKG P-R INTERVAL: 134 MS
EKG Q-T INTERVAL: 330 MS
EKG QRS DURATION: 90 MS
EKG QTC CALCULATION (BAZETT): 432 MS
EKG R AXIS: -21 DEGREES
EKG T AXIS: 17 DEGREES
EKG VENTRICULAR RATE: 103 BPM

## 2022-08-31 NOTE — DISCHARGE INSTRUCTIONS
Return to the Emergency Department immediately if you develop worsening shortness of breath, fatigue, chest pain, or passing out. Follow up with your primary care doctor by calling the office tomorrow    Prevention steps for  People with confirmed or suspected COVID-19 (including persons under investigation) who do not need to be hospitalized  and   People with confirmed COVID-19 who were hospitalized and determined to be medically stable to go home  Your healthcare provider and public health staff will evaluate whether you can be cared for at home. If it is determined that you do not need to be hospitalized and can be isolated at home, you will be monitored by staff from your local or state health department. You should follow the prevention steps below until a healthcare provider or local or state health department says you can return to your normal activities. Stay home except to get medical care  People who are mildly ill with COVID-19 are able to isolate at home during their illness. You should restrict activities outside your home, except for getting medical care. Do not go to work, school, or public areas. Avoid using public transportation, ride-sharing, or taxis. Separate yourself from other people and animals in your home  People: As much as possible, you should stay in a specific room and away from other people in your home. Also, you should use a separate bathroom, if available. Animals: You should restrict contact with pets and other animals while you are sick with COVID-19, just like you would around other people. Although there have not been reports of pets or other animals becoming sick with COVID-19, it is still recommended that people sick with COVID-19 limit contact with animals until more information is known about the virus. When possible, have another member of your household care for your animals while you are sick.  If you are sick with COVID-19, avoid contact with your pet, including petting, snuggling, being kissed or licked, and sharing food. If you must care for your pet or be around animals while you are sick, wash your hands before and after you interact with pets and wear a facemask. See COVID-19 and Animals for more information. Call ahead before visiting your doctor  If you have a medical appointment, call the healthcare provider and tell them that you have or may have COVID-19. This will help the healthcare providers office take steps to keep other people from getting infected or exposed. Wear a facemask  You should wear a facemask when you are around other people (e.g., sharing a room or vehicle) or pets and before you enter a healthcare providers office. If you are not able to wear a facemask (for example, because it causes trouble breathing), then people who live with you should not stay in the same room with you, or they should wear a facemask if they enter your room. Cover your coughs and sneezes  Cover your mouth and nose with a tissue when you cough or sneeze. Throw used tissues in a lined trash can. Immediately wash your hands with soap and water for at least 20 seconds or, if soap and water are not available, clean your hands with an alcohol-based hand  that contains at least 60% alcohol. Clean your hands often  Wash your hands often with soap and water for at least 20 seconds, especially after blowing your nose, coughing, or sneezing; going to the bathroom; and before eating or preparing food. If soap and water are not readily available, use an alcohol-based hand  with at least 60% alcohol, covering all surfaces of your hands and rubbing them together until they feel dry. Soap and water are the best option if hands are visibly dirty. Avoid touching your eyes, nose, and mouth with unwashed hands.   Avoid sharing personal household items  You should not share dishes, drinking glasses, cups, eating utensils, towels, or bedding with other people or pets in your home. After using these items, they should be washed thoroughly with soap and water. Clean all high-touch surfaces everyday  High touch surfaces include counters, tabletops, doorknobs, bathroom fixtures, toilets, phones, keyboards, tablets, and bedside tables. Also, clean any surfaces that may have blood, stool, or body fluids on them. Use a household cleaning spray or wipe, according to the label instructions. Labels contain instructions for safe and effective use of the cleaning product including precautions you should take when applying the product, such as wearing gloves and making sure you have good ventilation during use of the product. Monitor your symptoms  Seek prompt medical attention if your illness is worsening (e.g., difficulty breathing). Before seeking care, call your healthcare provider and tell them that you have, or are being evaluated for, COVID-19. Put on a facemask before you enter the facility. These steps will help the healthcare providers office to keep other people in the office or waiting room from getting infected or exposed. Ask your healthcare provider to call the local or [facility] health department. Persons who are placed under active monitoring or facilitated self-monitoring should follow instructions provided by their local health department or occupational health professionals, as appropriate. When working with your local health department check their available hours. If you have a medical emergency and need to call 911, notify the dispatch personnel that you have, or are being evaluated for COVID-19. If possible, put on a facemask before emergency medical services arrive. Discontinuing home isolation  Patients with confirmed COVID-19 should remain under home isolation precautions until the risk of secondary transmission to others is thought to be low.  The decision to discontinue home isolation precautions should be made on a case-by-case basis, in consultation with healthcare providers and state and local health departments. [employer].  Local [place] departments:  Judson Lee [phone] [address] [facility]  Alivia Malone [phone]  [facility]  [address] [facility]  [address]  [address] [phone]  Kannan Kunz [phone]  You can call [phone] or [phone] 24 hours a day. Recommended precautions for household members, intimate partners, and caregivers in a nonhealthcare setting1 of  A patient with symptomatic laboratory-confirmed COVID-19  or  A patient under investigation  Household members, intimate partners, and caregivers in a nonhealthcare setting may have close contact2 with a person with symptomatic, laboratory-confirmed COVID-19 or a person under investigation. Close contacts should monitor their health; they should call their healthcare provider right away if they develop symptoms suggestive of COVID-19 (e.g., fever, cough, shortness of breath) (see Interim [place] Guidance for Risk Assessment and Public Health Management of Persons with Potential Coronavirus Disease 2019 (COVID-19) Exposure in Travel-associated or [employer].)  Close contacts should also follow these recommendations:  Make sure that you understand and can help the patient follow their healthcare providers instructions for medication(s) and care. You should help the patient with basic needs in the home and provide support for getting groceries, prescriptions, and other personal needs. Monitor the patients symptoms. If the patient is getting sicker, call his or her healthcare provider and tell them that the patient has laboratory-confirmed COVID-19. This will help the healthcare providers office take steps to keep other people in the office or waiting room from getting infected.  Ask the healthcare provider to call the local or state health department for additional guidance. If the patient has a medical emergency and you need to call 911, notify the dispatch personnel that the patient has, or is being evaluated for COVID-19. Household members should stay in another room or be  from the patient as much as possible. Household members should use a separate bedroom and bathroom, if available. Prohibit visitors who do not have an essential need to be in the home. Household members should care for any pets in the home. Do not handle pets or other animals while sick. For more information, see COVID-19 and Animals. Make sure that shared spaces in the home have good air flow, such as by an air conditioner or an opened window, weather permitting. Perform hand hygiene frequently. Wash your hands often with soap and water for at least 20 seconds or use an alcohol-based hand  that contains 60 to 95% alcohol, covering all surfaces of your hands and rubbing them together until they feel dry. Soap and water should be used preferentially if hands are visibly dirty. Avoid touching your eyes, nose, and mouth with unwashed hands. The patient should wear a facemask when you are around other people. If the patient is not able to wear a facemask (for example, because it causes trouble breathing), you, as the caregiver, should wear a mask when you are in the same room as the patient. Wear a disposable facemask and gloves when you touch or have contact with the patients blood, stool, or body fluids, such as saliva, sputum, nasal mucus, vomit, urine. Throw out disposable facemasks and gloves after using them. Do not reuse. When removing personal protective equipment, first remove and dispose of gloves. Then, immediately clean your hands with soap and water or alcohol-based hand . Next, remove and dispose of facemask, and immediately clean your hands again with soap and water or alcohol-based hand .   Avoid sharing household items with the patient. You should not share dishes, drinking glasses, cups, eating utensils, towels, bedding, or other items. After the patient uses these items, you should wash them thoroughly (see below [employer]). Clean all high-touch surfaces, such as counters, tabletops, doorknobs, bathroom fixtures, toilets, phones, keyboards, tablets, and bedside tables, every day. Also, clean any surfaces that may have blood, stool, or body fluids on them. Use a household cleaning spray or wipe, according to the label instructions. Labels contain instructions for safe and effective use of the cleaning product including precautions you should take when applying the product, such as wearing gloves and making sure you have good ventilation during use of the product. [address] thoroughly. Immediately remove and wash clothes or bedding that have blood, stool, or body fluids on them. Wear disposable gloves while handling soiled items and keep soiled items away from your body. Clean your hands (with soap and water or an alcohol-based hand ) immediately after removing your gloves. Read and follow directions on labels of laundry or clothing items and detergent. In general, using a normal laundry detergent according to washing machine instructions and dry thoroughly using the warmest temperatures recommended on the clothing label. Place all used disposable gloves, facemasks, and other contaminated items in a lined container before disposing of them with other household waste. Clean your hands (with soap and water or an alcohol-based hand ) immediately after handling these items. Soap and water should be used preferentially if hands are visibly dirty. Discuss any additional questions with your state or local health department or healthcare provider. Check available hours when contacting your local health department.

## 2022-08-31 NOTE — ED NOTES
Pt brought in by squad c/o cough and midsternal chest pain when she coughs. She is covid positive. Reports the cough is getting progressively worse. Reports she has been to her PCP but they did not prescribe her anything for the cough. Pt alert and oriented.       Minor Leiva RN  08/30/22 0594

## 2023-02-24 ENCOUNTER — HOSPITAL ENCOUNTER (OUTPATIENT)
Age: 68
Setting detail: SPECIMEN
Discharge: HOME | End: 2023-02-24

## 2023-02-24 LAB
ABSOLUTE EOS #: 0.09 K/UL (ref 0–0.44)
ABSOLUTE EOS #: 0.09 K/UL (ref 0–0.44)
ABSOLUTE IMMATURE GRANULOCYTE: <0.03 K/UL (ref 0–0.3)
ABSOLUTE IMMATURE GRANULOCYTE: <0.03 K/UL (ref 0–0.3)
ABSOLUTE LYMPH #: 0.78 K/UL (ref 1.1–3.7)
ABSOLUTE LYMPH #: 0.85 K/UL (ref 1.1–3.7)
ABSOLUTE MONO #: 0.42 K/UL (ref 0.1–1.2)
ABSOLUTE MONO #: 0.45 K/UL (ref 0.1–1.2)
ALBUMIN SERPL-MCNC: 4.1 G/DL (ref 3.5–5.2)
ALBUMIN SERPL-MCNC: 4.2 G/DL (ref 3.5–5.2)
ALBUMIN SERPL-MCNC: 4.4 G/DL (ref 3.5–5.2)
ALBUMIN/GLOBULIN RATIO: 1.9 (ref 1–2.5)
ALBUMIN/GLOBULIN RATIO: 2.1 (ref 1–2.5)
ALBUMIN/GLOBULIN RATIO: 3.4 (ref 1–2.5)
ALP SERPL-CCNC: 59 U/L (ref 35–104)
ALP SERPL-CCNC: 60 U/L (ref 35–104)
ALP SERPL-CCNC: 62 U/L (ref 35–104)
ALT SERPL-CCNC: 11 U/L (ref 5–33)
ANION GAP SERPL CALCULATED.3IONS-SCNC: 15 MMOL/L (ref 9–17)
ANION GAP SERPL CALCULATED.3IONS-SCNC: 16 MMOL/L (ref 9–17)
AST SERPL-CCNC: 12 U/L
AST SERPL-CCNC: 13 U/L
AST SERPL-CCNC: 14 U/L
BASOPHILS # BLD: 1 % (ref 0–2)
BASOPHILS # BLD: 1 % (ref 0–2)
BASOPHILS ABSOLUTE: 0.03 K/UL (ref 0–0.2)
BASOPHILS ABSOLUTE: 0.03 K/UL (ref 0–0.2)
BILIRUB DIRECT SERPL-MCNC: 0.1 MG/DL
BILIRUB INDIRECT SERPL-MCNC: 0.1 MG/DL (ref 0–1)
BILIRUB SERPL-MCNC: 0.2 MG/DL (ref 0.3–1.2)
BILIRUB SERPL-MCNC: 0.2 MG/DL (ref 0.3–1.2)
BILIRUB SERPL-MCNC: 0.3 MG/DL (ref 0.3–1.2)
BUN SERPL-MCNC: 13 MG/DL (ref 8–23)
BUN SERPL-MCNC: 13 MG/DL (ref 8–23)
CALCIUM SERPL-MCNC: 9.5 MG/DL (ref 8.6–10.4)
CALCIUM SERPL-MCNC: 9.6 MG/DL (ref 8.6–10.4)
CHLORIDE SERPL-SCNC: 106 MMOL/L (ref 98–107)
CHLORIDE SERPL-SCNC: 107 MMOL/L (ref 98–107)
CO2 SERPL-SCNC: 24 MMOL/L (ref 20–31)
CO2 SERPL-SCNC: 24 MMOL/L (ref 20–31)
COMPLEMENT C3: 134 MG/DL (ref 90–180)
COMPLEMENT C4: 8 MG/DL (ref 10–40)
CREAT SERPL-MCNC: 0.73 MG/DL (ref 0.5–0.9)
CREAT SERPL-MCNC: 0.76 MG/DL (ref 0.5–0.9)
CRP SERPL HS-MCNC: 10.6 MG/L (ref 0–5)
EOSINOPHILS RELATIVE PERCENT: 2 % (ref 1–4)
EOSINOPHILS RELATIVE PERCENT: 2 % (ref 1–4)
FOLATE SERPL-MCNC: >20 NG/ML
GFR SERPL CREATININE-BSD FRML MDRD: >60 ML/MIN/1.73M2
GFR SERPL CREATININE-BSD FRML MDRD: >60 ML/MIN/1.73M2
GLUCOSE SERPL-MCNC: 90 MG/DL (ref 70–99)
GLUCOSE SERPL-MCNC: 90 MG/DL (ref 70–99)
HAPTOGLOB SERPL-MCNC: 108 MG/DL (ref 30–200)
HAV AB SERPL IA-ACNC: NONREACTIVE
HBV SURFACE AB SERPL IA-ACNC: <3.5 MIU/ML
HCT VFR BLD AUTO: 39.1 % (ref 36.3–47.1)
HCT VFR BLD AUTO: 39.2 % (ref 36.3–47.1)
HCV AB SER QL: NONREACTIVE
HGB BLD-MCNC: 12.2 G/DL (ref 11.9–15.1)
HGB BLD-MCNC: 12.3 G/DL (ref 11.9–15.1)
HIV 1+2 AB+HIV1 P24 AG SERPL QL IA: NONREACTIVE
IGA SERPL-MCNC: ABNORMAL MG/DL (ref 70–400)
IGA, LOW RANGE: 43 MG/DL (ref 70–400)
IGG: 433 MG/DL (ref 700–1600)
IGM: 32 MG/DL (ref 40–230)
IMMATURE GRANULOCYTES: 0 %
IMMATURE GRANULOCYTES: 0 %
IRON SATURATION: 26 % (ref 20–55)
IRON SERPL-MCNC: 61 UG/DL (ref 37–145)
LDLC SERPL-MCNC: 213 U/L (ref 135–214)
LDLC SERPL-MCNC: 229 U/L (ref 135–214)
LYMPHOCYTES # BLD: 16 % (ref 24–43)
LYMPHOCYTES # BLD: 16 % (ref 24–43)
MCH RBC QN AUTO: 31.8 PG (ref 25.2–33.5)
MCH RBC QN AUTO: 32.1 PG (ref 25.2–33.5)
MCHC RBC AUTO-ENTMCNC: 31.1 G/DL (ref 28.4–34.8)
MCHC RBC AUTO-ENTMCNC: 31.5 G/DL (ref 28.4–34.8)
MCV RBC AUTO: 102.1 FL (ref 82.6–102.9)
MCV RBC AUTO: 102.1 FL (ref 82.6–102.9)
MONOCYTES # BLD: 9 % (ref 3–12)
MONOCYTES # BLD: 9 % (ref 3–12)
NRBC AUTOMATED: 0 PER 100 WBC
NRBC AUTOMATED: 0 PER 100 WBC
PDW BLD-RTO: 14.4 % (ref 11.8–14.4)
PDW BLD-RTO: 14.5 % (ref 11.8–14.4)
PLATELET # BLD AUTO: 195 K/UL (ref 138–453)
PLATELET # BLD AUTO: 202 K/UL (ref 138–453)
PMV BLD AUTO: 9.3 FL (ref 8.1–13.5)
PMV BLD AUTO: 9.3 FL (ref 8.1–13.5)
POTASSIUM SERPL-SCNC: 3.7 MMOL/L (ref 3.7–5.3)
POTASSIUM SERPL-SCNC: 3.7 MMOL/L (ref 3.7–5.3)
PROT SERPL-MCNC: 5.7 G/DL (ref 6.4–8.3)
PROT SERPL-MCNC: 6.2 G/DL (ref 6.4–8.3)
PROT SERPL-MCNC: 6.3 G/DL (ref 6.4–8.3)
RBC # BLD: 3.83 M/UL (ref 3.95–5.11)
RBC # BLD: 3.84 M/UL (ref 3.95–5.11)
RBC # BLD: ABNORMAL 10*6/UL
SEG NEUTROPHILS: 72 % (ref 36–65)
SEG NEUTROPHILS: 72 % (ref 36–65)
SEGMENTED NEUTROPHILS ABSOLUTE COUNT: 3.62 K/UL (ref 1.5–8.1)
SEGMENTED NEUTROPHILS ABSOLUTE COUNT: 3.77 K/UL (ref 1.5–8.1)
SODIUM SERPL-SCNC: 145 MMOL/L (ref 135–144)
SODIUM SERPL-SCNC: 147 MMOL/L (ref 135–144)
TIBC SERPL-MCNC: 237 UG/DL (ref 250–450)
UNSATURATED IRON BINDING CAPACITY: 176 UG/DL (ref 112–347)
VIT B12 SERPL-MCNC: 623 PG/ML (ref 232–1245)
WBC # BLD AUTO: 5 K/UL (ref 3.5–11.3)
WBC # BLD AUTO: 5.2 K/UL (ref 3.5–11.3)

## 2023-02-25 LAB
% NK (CD56): 11 % (ref 6–29)
AB NK (CD56): 88 /UL (ref 90–600)
ABSOLUTE CD 3: 688 /UL (ref 411–2061)
ABSOLUTE CD 4 HELPER: 392 /UL (ref 309–1571)
ABSOLUTE CD 8 (SUPP): 296 /UL (ref 282–999)
ABSOLUTE CD19 B CELL: 8 /UL (ref 71–567)
CD19 B CELL: 1 % (ref 5–25)
CD3 % TOTAL T CELLS: 86 % (ref 57–94)
CD4 % HELPER T CELL: 49 % (ref 27–64)
CD4:CD8: 1.32 (ref 0.6–2.8)
CD8 % SUPPRESSOR T CELL: 37 % (ref 17–48)
DSDNA IGG SER QL IA: <0.5 IU/ML
LYMPHOCYTES # BLD: 16 % (ref 24–44)
WBC # BLD: 5 K/UL (ref 3.5–11)

## 2023-02-26 LAB — TETANUS IGG AB: 0.1 IU/ML

## 2023-04-06 ENCOUNTER — HOSPITAL ENCOUNTER (OUTPATIENT)
Facility: CLINIC | Age: 68
Setting detail: SPECIMEN
Discharge: HOME | End: 2023-04-06
Payer: MEDICARE

## 2023-04-06 LAB
BILIRUBIN URINE: NEGATIVE
COLOR: YELLOW
COMMENT UA: NORMAL
GLUCOSE UR STRIP.AUTO-MCNC: NEGATIVE MG/DL
KETONES UR STRIP.AUTO-MCNC: NEGATIVE MG/DL
LEUKOCYTE ESTERASE UR QL STRIP.AUTO: NEGATIVE
NITRITE UR QL STRIP.AUTO: NEGATIVE
PROT UR STRIP.AUTO-MCNC: 6 MG/DL (ref 5–8)
PROT UR STRIP.AUTO-MCNC: NEGATIVE MG/DL
SPECIFIC GRAVITY UA: 1.01 (ref 1–1.03)
TURBIDITY: CLEAR
URINE HGB: NEGATIVE
UROBILINOGEN, URINE: NORMAL

## 2023-04-06 PROCEDURE — 81003 URINALYSIS AUTO W/O SCOPE: CPT

## 2023-04-24 ENCOUNTER — HOSPITAL ENCOUNTER (OUTPATIENT)
Age: 68
Setting detail: SPECIMEN
Discharge: HOME | End: 2023-04-24

## 2023-04-24 LAB
ABSOLUTE EOS #: 0.07 K/UL (ref 0–0.4)
ABSOLUTE IMMATURE GRANULOCYTE: 0 K/UL (ref 0–0.3)
ABSOLUTE LYMPH #: 0.48 K/UL (ref 1–4.8)
ABSOLUTE MONO #: 0.15 K/UL (ref 0.1–0.8)
ALBUMIN SERPL-MCNC: 4.1 G/DL (ref 3.5–5.2)
ALBUMIN/GLOBULIN RATIO: 2 (ref 1–2.5)
ALP SERPL-CCNC: 59 U/L (ref 35–104)
ALT SERPL-CCNC: 15 U/L (ref 5–33)
ANION GAP SERPL CALCULATED.3IONS-SCNC: 12 MMOL/L (ref 9–17)
AST SERPL-CCNC: 14 U/L
ATYPICAL LYMPHOCYTE ABSOLUTE COUNT: 0.11 K/UL
ATYPICAL LYMPHOCYTES: 3 %
BASOPHILS # BLD: 0 % (ref 0–2)
BASOPHILS ABSOLUTE: 0 K/UL (ref 0–0.2)
BILIRUB SERPL-MCNC: 0.9 MG/DL (ref 0.3–1.2)
BILIRUBIN URINE: NEGATIVE
BUN SERPL-MCNC: 11 MG/DL (ref 8–23)
CALCIUM SERPL-MCNC: 9.3 MG/DL (ref 8.6–10.4)
CHLORIDE SERPL-SCNC: 103 MMOL/L (ref 98–107)
CO2 SERPL-SCNC: 23 MMOL/L (ref 20–31)
COLOR: YELLOW
COMPLEMENT C3: 133 MG/DL (ref 90–180)
COMPLEMENT C4: 9 MG/DL (ref 10–40)
CREAT SERPL-MCNC: 0.84 MG/DL (ref 0.5–0.9)
CREATININE URINE: 42.5 MG/DL (ref 28–217)
CRP SERPL HS-MCNC: 13.3 MG/L (ref 0–5)
EOSINOPHILS RELATIVE PERCENT: 2 % (ref 1–4)
EPITHELIAL CELLS UA: NORMAL /HPF (ref 0–5)
ERYTHROCYTE [SEDIMENTATION RATE] IN BLOOD BY WESTERGREN METHOD: 21 MM/HR (ref 0–30)
GFR SERPL CREATININE-BSD FRML MDRD: >60 ML/MIN/1.73M2
GLUCOSE SERPL-MCNC: 91 MG/DL (ref 70–99)
GLUCOSE UR STRIP.AUTO-MCNC: NEGATIVE MG/DL
HAPTOGLOB SERPL-MCNC: 86 MG/DL (ref 30–200)
HCT VFR BLD AUTO: 41.4 % (ref 36.3–47.1)
HGB BLD-MCNC: 13.5 G/DL (ref 11.9–15.1)
IMMATURE GRANULOCYTES: 0 %
KETONES UR STRIP.AUTO-MCNC: NEGATIVE MG/DL
LDLC SERPL-MCNC: 245 U/L (ref 135–214)
LEUKOCYTE ESTERASE UR QL STRIP.AUTO: ABNORMAL
LYMPHOCYTES # BLD: 13 % (ref 24–44)
MCH RBC QN AUTO: 32.3 PG (ref 25.2–33.5)
MCHC RBC AUTO-ENTMCNC: 32.6 G/DL (ref 28.4–34.8)
MCV RBC AUTO: 99 FL (ref 82.6–102.9)
MONOCYTES # BLD: 4 % (ref 1–7)
MORPHOLOGY: NORMAL
NITRITE UR QL STRIP.AUTO: NEGATIVE
NRBC AUTOMATED: 0 PER 100 WBC
PDW BLD-RTO: 13.8 % (ref 11.8–14.4)
PLATELET # BLD AUTO: 160 K/UL (ref 138–453)
PMV BLD AUTO: 9.2 FL (ref 8.1–13.5)
POTASSIUM SERPL-SCNC: 3.4 MMOL/L (ref 3.7–5.3)
PROT SERPL-MCNC: 6.2 G/DL (ref 6.4–8.3)
PROT UR STRIP.AUTO-MCNC: 6.5 MG/DL (ref 5–8)
PROT UR STRIP.AUTO-MCNC: NEGATIVE MG/DL
RBC # BLD: 4.18 M/UL (ref 3.95–5.11)
RBC CLUMPS #/AREA URNS AUTO: NORMAL /HPF (ref 0–4)
SEG NEUTROPHILS: 78 % (ref 36–66)
SEGMENTED NEUTROPHILS ABSOLUTE COUNT: 2.89 K/UL (ref 1.8–7.7)
SODIUM SERPL-SCNC: 138 MMOL/L (ref 135–144)
SPECIFIC GRAVITY UA: 1.01 (ref 1–1.03)
TOTAL PROTEIN, URINE: 5 MG/DL
TURBIDITY: CLEAR
URINE HGB: NEGATIVE
UROBILINOGEN, URINE: NORMAL
WBC # BLD AUTO: 3.7 K/UL (ref 3.5–11.3)
WBC UA: NORMAL /HPF (ref 0–5)

## 2023-04-25 LAB — DSDNA IGG SER QL IA: <0.5 IU/ML

## 2023-07-13 ENCOUNTER — HOSPITAL ENCOUNTER (OUTPATIENT)
Age: 68
Setting detail: SPECIMEN
Discharge: HOME | End: 2023-07-13

## 2023-07-13 LAB
A1AT SERPL-MCNC: 178 MG/DL (ref 90–200)
CERULOPLASMIN SERPL-MCNC: 27 MG/DL (ref 16–45)
FERRITIN SERPL-MCNC: 306 NG/ML (ref 13–150)
HAV IGM SERPL QL IA: NONREACTIVE
HBV CORE AB SER QL: NONREACTIVE
HBV CORE IGM SERPL QL IA: NONREACTIVE
HBV SURFACE AB SERPL IA-ACNC: <3.5 MIU/ML
HBV SURFACE AG SERPL QL IA: NONREACTIVE
HCV AB SERPL QL IA: NONREACTIVE
IGG SERPL-MCNC: 399 MG/DL (ref 700–1600)

## 2023-07-15 LAB
MITOCHONDRIA M2 IGG SER-ACNC: <0.5 U/ML (ref 0–4)
SMOOTH MUSCLE ANTIBODY: 2 UNITS (ref 0–19)

## 2023-08-10 ENCOUNTER — HOSPITAL ENCOUNTER (OUTPATIENT)
Age: 68
Setting detail: SPECIMEN
Discharge: HOME | End: 2023-08-10

## 2023-08-10 LAB
ALBUMIN SERPL-MCNC: 3.8 G/DL (ref 3.5–5.2)
ALBUMIN/GLOB SERPL: 1.5 {RATIO} (ref 1–2.5)
ALP SERPL-CCNC: 48 U/L (ref 35–104)
ALT SERPL-CCNC: 12 U/L (ref 5–33)
ANION GAP SERPL CALCULATED.3IONS-SCNC: 13 MMOL/L (ref 9–17)
AST SERPL-CCNC: 13 U/L
BASOPHILS # BLD: 0 K/UL (ref 0–0.2)
BASOPHILS NFR BLD: 0 % (ref 0–2)
BILIRUB SERPL-MCNC: 0.2 MG/DL (ref 0.3–1.2)
BUN SERPL-MCNC: 9 MG/DL (ref 8–23)
C3 SERPL-MCNC: 120 MG/DL (ref 90–180)
C4 SERPL-MCNC: 10 MG/DL (ref 10–40)
CALCIUM SERPL-MCNC: 9.5 MG/DL (ref 8.6–10.4)
CHLORIDE SERPL-SCNC: 102 MMOL/L (ref 98–107)
CO2 SERPL-SCNC: 23 MMOL/L (ref 20–31)
CREAT SERPL-MCNC: 0.9 MG/DL (ref 0.5–0.9)
CREAT UR-MCNC: 67 MG/DL (ref 28–217)
CRP SERPL HS-MCNC: <3 MG/L (ref 0–5)
EOSINOPHIL # BLD: 0.05 K/UL (ref 0–0.4)
EOSINOPHILS RELATIVE PERCENT: 1 % (ref 1–4)
ERYTHROCYTE [DISTWIDTH] IN BLOOD BY AUTOMATED COUNT: 14.4 % (ref 11.8–14.4)
ERYTHROCYTE [SEDIMENTATION RATE] IN BLOOD BY PHOTOMETRIC METHOD: 23 MM/HR (ref 0–30)
GFR SERPL CREATININE-BSD FRML MDRD: >60 ML/MIN/1.73M2
GLUCOSE SERPL-MCNC: 107 MG/DL (ref 70–99)
HAPTOGLOB SERPL-MCNC: 89 MG/DL (ref 30–200)
HCT VFR BLD AUTO: 40.9 % (ref 36.3–47.1)
HGB BLD-MCNC: 12.9 G/DL (ref 11.9–15.1)
IMM GRANULOCYTES # BLD AUTO: 0 K/UL (ref 0–0.3)
IMM GRANULOCYTES NFR BLD: 0 %
LDH SERPL-CCNC: 205 U/L (ref 135–214)
LYMPHOCYTES NFR BLD: 0.83 K/UL (ref 1–4.8)
LYMPHOCYTES RELATIVE PERCENT: 18 % (ref 24–44)
MCH RBC QN AUTO: 31.9 PG (ref 25.2–33.5)
MCHC RBC AUTO-ENTMCNC: 31.5 G/DL (ref 28.4–34.8)
MCV RBC AUTO: 101.2 FL (ref 82.6–102.9)
MONOCYTES NFR BLD: 0.14 K/UL (ref 0.1–0.8)
MONOCYTES NFR BLD: 3 % (ref 1–7)
MORPHOLOGY: NORMAL
NEUTROPHILS NFR BLD: 78 % (ref 36–66)
NEUTS SEG NFR BLD: 3.58 K/UL (ref 1.8–7.7)
NRBC BLD-RTO: 0 PER 100 WBC
PLATELET # BLD AUTO: 191 K/UL (ref 138–453)
PMV BLD AUTO: 9.4 FL (ref 8.1–13.5)
POTASSIUM SERPL-SCNC: 3.7 MMOL/L (ref 3.7–5.3)
PROT SERPL-MCNC: 6.4 G/DL (ref 6.4–8.3)
RBC # BLD AUTO: 4.04 M/UL (ref 3.95–5.11)
SODIUM SERPL-SCNC: 138 MMOL/L (ref 135–144)
TOTAL PROTEIN, URINE: 10 MG/DL
URINE TOTAL PROTEIN CREATININE RATIO: 0.15 (ref 0–0.2)
WBC OTHER # BLD: 4.6 K/UL (ref 3.5–11.3)

## 2023-08-14 LAB
C282Y HEMOCHROMATOSIS MUT: NORMAL
H63D HEMOCHROMATOSIS MUT: NEGATIVE
HEMOCHROMATOSIS MUTATION INT: NORMAL
HEMOCHROMATOSIS SPECIMEN: NORMAL
S65C HEMOCHROMATOSIS MUT: NEGATIVE

## 2023-08-16 LAB — DSDNA IGG SER QL IA: <0.5 IU/ML

## 2023-09-29 ENCOUNTER — HOSPITAL ENCOUNTER (OUTPATIENT)
Age: 68
Setting detail: SPECIMEN
Discharge: HOME | End: 2023-09-29

## 2023-09-29 LAB
ALBUMIN SERPL-MCNC: 4 G/DL (ref 3.5–5.2)
ALBUMIN/GLOB SERPL: 1.5 {RATIO} (ref 1–2.5)
ALP SERPL-CCNC: 48 U/L (ref 35–104)
ALT SERPL-CCNC: 21 U/L (ref 5–33)
ANION GAP SERPL CALCULATED.3IONS-SCNC: 10 MMOL/L (ref 9–17)
AST SERPL-CCNC: 21 U/L
BACTERIA URNS QL MICRO: ABNORMAL
BASOPHILS # BLD: <0.03 K/UL (ref 0–0.2)
BASOPHILS NFR BLD: 0 % (ref 0–2)
BILIRUB SERPL-MCNC: 0.4 MG/DL (ref 0.3–1.2)
BILIRUB UR QL STRIP: NEGATIVE
BUN SERPL-MCNC: 9 MG/DL (ref 8–23)
C3 SERPL-MCNC: 124 MG/DL (ref 90–180)
C4 SERPL-MCNC: 11 MG/DL (ref 10–40)
CALCIUM SERPL-MCNC: 9.4 MG/DL (ref 8.6–10.4)
CASTS #/AREA URNS LPF: ABNORMAL /LPF (ref 0–8)
CHLORIDE SERPL-SCNC: 100 MMOL/L (ref 98–107)
CLARITY UR: CLEAR
CO2 SERPL-SCNC: 24 MMOL/L (ref 20–31)
COLOR UR: YELLOW
CREAT SERPL-MCNC: 0.8 MG/DL (ref 0.5–0.9)
CREAT UR-MCNC: 52.9 MG/DL (ref 28–217)
CRP SERPL HS-MCNC: <3 MG/L (ref 0–5)
EOSINOPHIL # BLD: 0.04 K/UL (ref 0–0.44)
EOSINOPHILS RELATIVE PERCENT: 1 % (ref 1–4)
EPI CELLS #/AREA URNS HPF: ABNORMAL /HPF (ref 0–5)
ERYTHROCYTE [DISTWIDTH] IN BLOOD BY AUTOMATED COUNT: 14.2 % (ref 11.8–14.4)
ERYTHROCYTE [SEDIMENTATION RATE] IN BLOOD BY PHOTOMETRIC METHOD: 16 MM/HR (ref 0–30)
GFR SERPL CREATININE-BSD FRML MDRD: >60 ML/MIN/1.73M2
GLUCOSE SERPL-MCNC: 85 MG/DL (ref 70–99)
GLUCOSE UR STRIP-MCNC: NEGATIVE MG/DL
HAPTOGLOB SERPL-MCNC: 69 MG/DL (ref 30–200)
HCT VFR BLD AUTO: 39.7 % (ref 36.3–47.1)
HGB BLD-MCNC: 12.9 G/DL (ref 11.9–15.1)
HGB UR QL STRIP.AUTO: NEGATIVE
IMM GRANULOCYTES # BLD AUTO: 0.03 K/UL (ref 0–0.3)
IMM GRANULOCYTES NFR BLD: 1 %
KETONES UR STRIP-MCNC: NEGATIVE MG/DL
LDH SERPL-CCNC: 201 U/L (ref 135–214)
LEUKOCYTE ESTERASE UR QL STRIP: ABNORMAL
LYMPHOCYTES NFR BLD: 0.7 K/UL (ref 1.1–3.7)
LYMPHOCYTES RELATIVE PERCENT: 18 % (ref 24–43)
MCH RBC QN AUTO: 32.7 PG (ref 25.2–33.5)
MCHC RBC AUTO-ENTMCNC: 32.5 G/DL (ref 28.4–34.8)
MCV RBC AUTO: 100.8 FL (ref 82.6–102.9)
MONOCYTES NFR BLD: 0.35 K/UL (ref 0.1–1.2)
MONOCYTES NFR BLD: 9 % (ref 3–12)
NEUTROPHILS NFR BLD: 71 % (ref 36–65)
NEUTS SEG NFR BLD: 2.68 K/UL (ref 1.5–8.1)
NITRITE UR QL STRIP: NEGATIVE
NRBC BLD-RTO: 0 PER 100 WBC
PH UR STRIP: 7 [PH] (ref 5–8)
PLATELET # BLD AUTO: 154 K/UL (ref 138–453)
PMV BLD AUTO: 9.8 FL (ref 8.1–13.5)
POTASSIUM SERPL-SCNC: 3.8 MMOL/L (ref 3.7–5.3)
PROT SERPL-MCNC: 6.7 G/DL (ref 6.4–8.3)
PROT UR STRIP-MCNC: NEGATIVE MG/DL
RBC # BLD AUTO: 3.94 M/UL (ref 3.95–5.11)
RBC #/AREA URNS HPF: ABNORMAL /HPF (ref 0–4)
SODIUM SERPL-SCNC: 134 MMOL/L (ref 135–144)
SP GR UR STRIP: 1.01 (ref 1–1.03)
TOTAL PROTEIN, URINE: 7 MG/DL
URINE TOTAL PROTEIN CREATININE RATIO: 0.13 (ref 0–0.2)
UROBILINOGEN UR STRIP-ACNC: NORMAL EU/DL (ref 0–1)
WBC #/AREA URNS HPF: ABNORMAL /HPF (ref 0–5)
WBC OTHER # BLD: 3.8 K/UL (ref 3.5–11.3)

## 2023-10-03 LAB
C282Y HEMOCHROMATOSIS MUT: NORMAL
DSDNA IGG SER QL IA: 1.1 IU/ML
H63D HEMOCHROMATOSIS MUT: NEGATIVE
HEMOCHROMATOSIS MUTATION INT: NORMAL
HEMOCHROMATOSIS SPECIMEN: NORMAL
S65C HEMOCHROMATOSIS MUT: NEGATIVE

## 2023-10-31 ENCOUNTER — HOSPITAL ENCOUNTER (OUTPATIENT)
Age: 68
Setting detail: SPECIMEN
Discharge: HOME | End: 2023-10-31

## 2023-11-12 ENCOUNTER — HOSPITAL ENCOUNTER (OUTPATIENT)
Age: 68
Setting detail: SPECIMEN
Discharge: HOME | End: 2023-11-12
Payer: MEDICARE

## 2023-11-12 LAB
BILIRUB UR QL STRIP: NEGATIVE
CLARITY UR: CLEAR
COLOR UR: YELLOW
GLUCOSE UR STRIP-MCNC: NEGATIVE MG/DL
HGB UR QL STRIP.AUTO: NEGATIVE
KETONES UR STRIP-MCNC: NEGATIVE MG/DL
LEUKOCYTE ESTERASE UR QL STRIP: NEGATIVE
NITRITE UR QL STRIP: NEGATIVE
PH UR STRIP: 7 [PH] (ref 5–8)
PROT UR STRIP-MCNC: NEGATIVE MG/DL
SP GR UR STRIP: 1.01 (ref 1–1.03)
UROBILINOGEN UR STRIP-ACNC: NORMAL EU/DL (ref 0–1)

## 2023-11-12 PROCEDURE — 81003 URINALYSIS AUTO W/O SCOPE: CPT

## 2024-12-04 ENCOUNTER — HOSPITAL ENCOUNTER (OUTPATIENT)
Age: 69
Setting detail: SPECIMEN
Discharge: HOME | End: 2024-12-04

## 2024-12-04 LAB
ALBUMIN SERPL-MCNC: 4.1 G/DL (ref 3.5–5.2)
ALBUMIN/GLOB SERPL: 1.5 {RATIO} (ref 1–2.5)
ALP SERPL-CCNC: 62 U/L (ref 35–104)
ALT SERPL-CCNC: 15 U/L (ref 10–35)
ANION GAP SERPL CALCULATED.3IONS-SCNC: 15 MMOL/L (ref 9–16)
AST SERPL-CCNC: 23 U/L (ref 10–35)
BASOPHILS # BLD: 0.03 K/UL (ref 0–0.2)
BASOPHILS NFR BLD: 1 % (ref 0–2)
BILIRUB SERPL-MCNC: 0.3 MG/DL (ref 0–1.2)
BILIRUB UR QL STRIP: NEGATIVE
BUN SERPL-MCNC: 8 MG/DL (ref 8–23)
C3 SERPL-MCNC: 132 MG/DL (ref 90–180)
C4 SERPL-MCNC: 8 MG/DL (ref 10–40)
CALCIUM SERPL-MCNC: 9.2 MG/DL (ref 8.6–10.4)
CHLORIDE SERPL-SCNC: 99 MMOL/L (ref 98–107)
CLARITY UR: CLEAR
CO2 SERPL-SCNC: 20 MMOL/L (ref 20–31)
COLOR UR: YELLOW
COMMENT: NORMAL
CREAT SERPL-MCNC: 0.8 MG/DL (ref 0.6–0.9)
CREAT UR-MCNC: 37.8 MG/DL (ref 28–217)
CRP SERPL HS-MCNC: <3 MG/L (ref 0–5)
EOSINOPHIL # BLD: 0.03 K/UL (ref 0–0.44)
EOSINOPHILS RELATIVE PERCENT: 1 % (ref 1–4)
ERYTHROCYTE [DISTWIDTH] IN BLOOD BY AUTOMATED COUNT: 13.8 % (ref 11.8–14.4)
ERYTHROCYTE [SEDIMENTATION RATE] IN BLOOD BY PHOTOMETRIC METHOD: 37 MM/HR (ref 0–30)
GFR, ESTIMATED: 79 ML/MIN/1.73M2
GLUCOSE SERPL-MCNC: 100 MG/DL (ref 74–99)
GLUCOSE UR STRIP-MCNC: NEGATIVE MG/DL
HAPTOGLOB SERPL-MCNC: 42 MG/DL (ref 30–200)
HCT VFR BLD AUTO: 37.8 % (ref 36.3–47.1)
HGB BLD-MCNC: 12.5 G/DL (ref 11.9–15.1)
HGB UR QL STRIP.AUTO: NEGATIVE
IMM GRANULOCYTES # BLD AUTO: <0.03 K/UL (ref 0–0.3)
IMM GRANULOCYTES NFR BLD: 0 %
KETONES UR STRIP-MCNC: NEGATIVE MG/DL
LDH SERPL-CCNC: 188 U/L (ref 135–214)
LEUKOCYTE ESTERASE UR QL STRIP: NEGATIVE
LYMPHOCYTES NFR BLD: 0.91 K/UL (ref 1.1–3.7)
LYMPHOCYTES RELATIVE PERCENT: 16 % (ref 24–43)
MCH RBC QN AUTO: 33.3 PG (ref 25.2–33.5)
MCHC RBC AUTO-ENTMCNC: 33.1 G/DL (ref 28.4–34.8)
MCV RBC AUTO: 100.8 FL (ref 82.6–102.9)
MONOCYTES NFR BLD: 0.46 K/UL (ref 0.1–1.2)
MONOCYTES NFR BLD: 8 % (ref 3–12)
NEUTROPHILS NFR BLD: 74 % (ref 36–65)
NEUTS SEG NFR BLD: 4.1 K/UL (ref 1.5–8.1)
NITRITE UR QL STRIP: NEGATIVE
NRBC BLD-RTO: 0 PER 100 WBC
PH UR STRIP: 7 [PH] (ref 5–8)
PLATELET # BLD AUTO: 149 K/UL (ref 138–453)
PMV BLD AUTO: 10.1 FL (ref 8.1–13.5)
POTASSIUM SERPL-SCNC: 3.5 MMOL/L (ref 3.7–5.3)
PROT SERPL-MCNC: 6.8 G/DL (ref 6.6–8.7)
PROT UR STRIP-MCNC: NEGATIVE MG/DL
RBC # BLD AUTO: 3.75 M/UL (ref 3.95–5.11)
SODIUM SERPL-SCNC: 134 MMOL/L (ref 136–145)
SP GR UR STRIP: 1.01 (ref 1–1.03)
TOTAL PROTEIN, URINE: <4 MG/DL
UROBILINOGEN UR STRIP-ACNC: NORMAL EU/DL (ref 0–1)
WBC OTHER # BLD: 5.6 K/UL (ref 3.5–11.3)

## 2024-12-05 LAB — DSDNA IGG SER QL IA: 0.7 IU/ML

## 2025-05-16 ENCOUNTER — HOSPITAL ENCOUNTER (OUTPATIENT)
Dept: PREADMISSION TESTING | Age: 70
Discharge: HOME | End: 2025-05-20

## 2025-05-16 ENCOUNTER — ANESTHESIA EVENT (OUTPATIENT)
Dept: OPERATING ROOM | Age: 70
End: 2025-05-16
Payer: MEDICARE

## 2025-05-16 VITALS — HEIGHT: 66 IN | BODY MASS INDEX: 36.96 KG/M2 | WEIGHT: 230 LBS

## 2025-05-16 RX ORDER — CEFAZOLIN SODIUM/WATER 2 G/20 ML
2000 SYRINGE (ML) INTRAVENOUS ONCE
Status: CANCELLED | OUTPATIENT
Start: 2025-05-21

## 2025-05-21 ENCOUNTER — ANESTHESIA (OUTPATIENT)
Dept: OPERATING ROOM | Age: 70
End: 2025-05-21
Payer: MEDICARE

## 2025-05-21 ENCOUNTER — HOSPITAL ENCOUNTER (OUTPATIENT)
Age: 70
Setting detail: OUTPATIENT SURGERY
Discharge: HOME | End: 2025-05-21
Attending: STUDENT IN AN ORGANIZED HEALTH CARE EDUCATION/TRAINING PROGRAM | Admitting: STUDENT IN AN ORGANIZED HEALTH CARE EDUCATION/TRAINING PROGRAM
Payer: MEDICARE

## 2025-05-21 VITALS
HEART RATE: 74 BPM | DIASTOLIC BLOOD PRESSURE: 83 MMHG | RESPIRATION RATE: 15 BRPM | BODY MASS INDEX: 36.96 KG/M2 | WEIGHT: 230 LBS | SYSTOLIC BLOOD PRESSURE: 144 MMHG | OXYGEN SATURATION: 99 % | TEMPERATURE: 97.5 F | HEIGHT: 66 IN

## 2025-05-21 DIAGNOSIS — Z85.72 HISTORY OF B-CELL LYMPHOMA: ICD-10-CM

## 2025-05-21 DIAGNOSIS — G89.18 ACUTE POST-OPERATIVE PAIN: Primary | ICD-10-CM

## 2025-05-21 LAB
ERYTHROCYTE [DISTWIDTH] IN BLOOD BY AUTOMATED COUNT: 13.3 % (ref 11.8–14.4)
HCT VFR BLD AUTO: 36.8 % (ref 36.3–47.1)
HGB BLD-MCNC: 13 G/DL (ref 11.9–15.1)
MCH RBC QN AUTO: 35.8 PG (ref 25.2–33.5)
MCHC RBC AUTO-ENTMCNC: 35.3 G/DL (ref 28.4–34.8)
MCV RBC AUTO: 101.4 FL (ref 82.6–102.9)
NRBC BLD-RTO: 0 PER 100 WBC
PLATELET # BLD AUTO: 115 K/UL (ref 138–453)
PMV BLD AUTO: 10 FL (ref 8.1–13.5)
RBC # BLD AUTO: 3.63 M/UL (ref 3.95–5.11)
WBC OTHER # BLD: 3.6 K/UL (ref 3.5–11.3)

## 2025-05-21 PROCEDURE — 88360 TUMOR IMMUNOHISTOCHEM/MANUAL: CPT

## 2025-05-21 PROCEDURE — 6370000000 HC RX 637 (ALT 250 FOR IP): Performed by: ANESTHESIOLOGY

## 2025-05-21 PROCEDURE — 7100000010 HC PHASE II RECOVERY - FIRST 15 MIN: Performed by: STUDENT IN AN ORGANIZED HEALTH CARE EDUCATION/TRAINING PROGRAM

## 2025-05-21 PROCEDURE — 3700000000 HC ANESTHESIA ATTENDED CARE: Performed by: STUDENT IN AN ORGANIZED HEALTH CARE EDUCATION/TRAINING PROGRAM

## 2025-05-21 PROCEDURE — 88342 IMHCHEM/IMCYTCHM 1ST ANTB: CPT

## 2025-05-21 PROCEDURE — 88341 IMHCHEM/IMCYTCHM EA ADD ANTB: CPT

## 2025-05-21 PROCEDURE — 3600000002 HC SURGERY LEVEL 2 BASE: Performed by: STUDENT IN AN ORGANIZED HEALTH CARE EDUCATION/TRAINING PROGRAM

## 2025-05-21 PROCEDURE — 7100000011 HC PHASE II RECOVERY - ADDTL 15 MIN: Performed by: STUDENT IN AN ORGANIZED HEALTH CARE EDUCATION/TRAINING PROGRAM

## 2025-05-21 PROCEDURE — 3600000012 HC SURGERY LEVEL 2 ADDTL 15MIN: Performed by: STUDENT IN AN ORGANIZED HEALTH CARE EDUCATION/TRAINING PROGRAM

## 2025-05-21 PROCEDURE — 2500000003 HC RX 250 WO HCPCS: Performed by: STUDENT IN AN ORGANIZED HEALTH CARE EDUCATION/TRAINING PROGRAM

## 2025-05-21 PROCEDURE — 6360000002 HC RX W HCPCS: Performed by: NURSE ANESTHETIST, CERTIFIED REGISTERED

## 2025-05-21 PROCEDURE — 2580000003 HC RX 258: Performed by: ANESTHESIOLOGY

## 2025-05-21 PROCEDURE — 3700000001 HC ADD 15 MINUTES (ANESTHESIA): Performed by: STUDENT IN AN ORGANIZED HEALTH CARE EDUCATION/TRAINING PROGRAM

## 2025-05-21 PROCEDURE — 85027 COMPLETE CBC AUTOMATED: CPT

## 2025-05-21 PROCEDURE — 2709999900 HC NON-CHARGEABLE SUPPLY: Performed by: STUDENT IN AN ORGANIZED HEALTH CARE EDUCATION/TRAINING PROGRAM

## 2025-05-21 PROCEDURE — 88307 TISSUE EXAM BY PATHOLOGIST: CPT

## 2025-05-21 RX ORDER — FENTANYL CITRATE 50 UG/ML
INJECTION, SOLUTION INTRAMUSCULAR; INTRAVENOUS
Status: DISCONTINUED | OUTPATIENT
Start: 2025-05-21 | End: 2025-05-21 | Stop reason: SDUPTHER

## 2025-05-21 RX ORDER — LORATADINE 10 MG/1
10 TABLET ORAL DAILY
COMMUNITY

## 2025-05-21 RX ORDER — SODIUM CHLORIDE 0.9 % (FLUSH) 0.9 %
5-40 SYRINGE (ML) INJECTION EVERY 12 HOURS SCHEDULED
Status: DISCONTINUED | OUTPATIENT
Start: 2025-05-21 | End: 2025-05-21 | Stop reason: HOSPADM

## 2025-05-21 RX ORDER — PANTOPRAZOLE SODIUM 40 MG/1
40 TABLET, DELAYED RELEASE ORAL 2 TIMES DAILY
COMMUNITY

## 2025-05-21 RX ORDER — ACYCLOVIR 400 MG/1
400 TABLET ORAL 2 TIMES DAILY
COMMUNITY

## 2025-05-21 RX ORDER — PROPOFOL 10 MG/ML
INJECTION, EMULSION INTRAVENOUS
Status: DISCONTINUED | OUTPATIENT
Start: 2025-05-21 | End: 2025-05-21 | Stop reason: SDUPTHER

## 2025-05-21 RX ORDER — CEFAZOLIN SODIUM/WATER 2 G/20 ML
SYRINGE (ML) INTRAVENOUS
Status: DISCONTINUED
Start: 2025-05-21 | End: 2025-05-21 | Stop reason: HOSPADM

## 2025-05-21 RX ORDER — PROCHLORPERAZINE EDISYLATE 5 MG/ML
10 INJECTION INTRAMUSCULAR; INTRAVENOUS
Status: DISCONTINUED | OUTPATIENT
Start: 2025-05-21 | End: 2025-05-21 | Stop reason: HOSPADM

## 2025-05-21 RX ORDER — SENNA AND DOCUSATE SODIUM 50; 8.6 MG/1; MG/1
1 TABLET, FILM COATED ORAL DAILY
Qty: 30 TABLET | Refills: 0 | Status: SHIPPED | OUTPATIENT
Start: 2025-05-21

## 2025-05-21 RX ORDER — HYDROMORPHONE HYDROCHLORIDE 1 MG/ML
0.5 INJECTION, SOLUTION INTRAMUSCULAR; INTRAVENOUS; SUBCUTANEOUS EVERY 5 MIN PRN
Status: DISCONTINUED | OUTPATIENT
Start: 2025-05-21 | End: 2025-05-21 | Stop reason: HOSPADM

## 2025-05-21 RX ORDER — HYDROXYCHLOROQUINE SULFATE 200 MG/1
200 TABLET, FILM COATED ORAL 2 TIMES DAILY
COMMUNITY

## 2025-05-21 RX ORDER — SODIUM CHLORIDE 9 MG/ML
INJECTION, SOLUTION INTRAVENOUS PRN
Status: DISCONTINUED | OUTPATIENT
Start: 2025-05-21 | End: 2025-05-21 | Stop reason: HOSPADM

## 2025-05-21 RX ORDER — ASPIRIN 81 MG/1
81 TABLET ORAL DAILY
COMMUNITY

## 2025-05-21 RX ORDER — FENTANYL CITRATE 50 UG/ML
25 INJECTION, SOLUTION INTRAMUSCULAR; INTRAVENOUS EVERY 5 MIN PRN
Status: DISCONTINUED | OUTPATIENT
Start: 2025-05-21 | End: 2025-05-21 | Stop reason: HOSPADM

## 2025-05-21 RX ORDER — METOCLOPRAMIDE HYDROCHLORIDE 5 MG/ML
10 INJECTION INTRAMUSCULAR; INTRAVENOUS
Status: DISCONTINUED | OUTPATIENT
Start: 2025-05-21 | End: 2025-05-21 | Stop reason: HOSPADM

## 2025-05-21 RX ORDER — CEFAZOLIN SODIUM 1 G/3ML
INJECTION, POWDER, FOR SOLUTION INTRAMUSCULAR; INTRAVENOUS
Status: DISCONTINUED | OUTPATIENT
Start: 2025-05-21 | End: 2025-05-21 | Stop reason: SDUPTHER

## 2025-05-21 RX ORDER — NALOXONE HYDROCHLORIDE 0.4 MG/ML
INJECTION, SOLUTION INTRAMUSCULAR; INTRAVENOUS; SUBCUTANEOUS PRN
Status: DISCONTINUED | OUTPATIENT
Start: 2025-05-21 | End: 2025-05-21 | Stop reason: HOSPADM

## 2025-05-21 RX ORDER — MEPERIDINE HYDROCHLORIDE 50 MG/ML
12.5 INJECTION INTRAMUSCULAR; INTRAVENOUS; SUBCUTANEOUS EVERY 5 MIN PRN
Status: DISCONTINUED | OUTPATIENT
Start: 2025-05-21 | End: 2025-05-21 | Stop reason: HOSPADM

## 2025-05-21 RX ORDER — MYCOPHENOLATE MOFETIL 500 MG/1
1000 TABLET ORAL 2 TIMES DAILY
COMMUNITY

## 2025-05-21 RX ORDER — OXYCODONE HYDROCHLORIDE 5 MG/1
5 TABLET ORAL
Status: COMPLETED | OUTPATIENT
Start: 2025-05-21 | End: 2025-05-21

## 2025-05-21 RX ORDER — POTASSIUM CHLORIDE 750 MG/1
10 CAPSULE, EXTENDED RELEASE ORAL 2 TIMES DAILY
COMMUNITY

## 2025-05-21 RX ORDER — DICYCLOMINE HYDROCHLORIDE 10 MG/1
10 CAPSULE ORAL 3 TIMES DAILY
COMMUNITY

## 2025-05-21 RX ORDER — BUPIVACAINE HYDROCHLORIDE AND EPINEPHRINE 5; 5 MG/ML; UG/ML
INJECTION, SOLUTION EPIDURAL; INTRACAUDAL; PERINEURAL PRN
Status: DISCONTINUED | OUTPATIENT
Start: 2025-05-21 | End: 2025-05-21 | Stop reason: ALTCHOICE

## 2025-05-21 RX ORDER — OXYCODONE HYDROCHLORIDE 5 MG/1
5 TABLET ORAL EVERY 6 HOURS PRN
Qty: 12 TABLET | Refills: 0 | Status: SHIPPED | OUTPATIENT
Start: 2025-05-21 | End: 2025-05-24

## 2025-05-21 RX ORDER — GABAPENTIN 300 MG/1
600 CAPSULE ORAL DAILY
COMMUNITY

## 2025-05-21 RX ORDER — ALENDRONATE SODIUM 70 MG/1
70 TABLET ORAL
COMMUNITY

## 2025-05-21 RX ORDER — DIPHENHYDRAMINE HYDROCHLORIDE 50 MG/ML
12.5 INJECTION, SOLUTION INTRAMUSCULAR; INTRAVENOUS
Status: DISCONTINUED | OUTPATIENT
Start: 2025-05-21 | End: 2025-05-21 | Stop reason: HOSPADM

## 2025-05-21 RX ORDER — SOLIFENACIN SUCCINATE 10 MG/1
10 TABLET, FILM COATED ORAL DAILY
COMMUNITY

## 2025-05-21 RX ORDER — SODIUM CHLORIDE 9 MG/ML
INJECTION, SOLUTION INTRAVENOUS CONTINUOUS
Status: DISCONTINUED | OUTPATIENT
Start: 2025-05-21 | End: 2025-05-21 | Stop reason: HOSPADM

## 2025-05-21 RX ORDER — FOLIC ACID 1 MG/1
1 TABLET ORAL DAILY
COMMUNITY

## 2025-05-21 RX ORDER — SODIUM CHLORIDE 0.9 % (FLUSH) 0.9 %
5-40 SYRINGE (ML) INJECTION PRN
Status: DISCONTINUED | OUTPATIENT
Start: 2025-05-21 | End: 2025-05-21 | Stop reason: HOSPADM

## 2025-05-21 RX ORDER — SODIUM CHLORIDE, SODIUM LACTATE, POTASSIUM CHLORIDE, CALCIUM CHLORIDE 600; 310; 30; 20 MG/100ML; MG/100ML; MG/100ML; MG/100ML
INJECTION, SOLUTION INTRAVENOUS CONTINUOUS
Status: DISCONTINUED | OUTPATIENT
Start: 2025-05-21 | End: 2025-05-21 | Stop reason: HOSPADM

## 2025-05-21 RX ORDER — FERROUS SULFATE 325(65) MG
325 TABLET ORAL
COMMUNITY

## 2025-05-21 RX ORDER — LIDOCAINE HYDROCHLORIDE 10 MG/ML
1 INJECTION, SOLUTION EPIDURAL; INFILTRATION; INTRACAUDAL; PERINEURAL
Status: DISCONTINUED | OUTPATIENT
Start: 2025-05-22 | End: 2025-05-21 | Stop reason: HOSPADM

## 2025-05-21 RX ORDER — LIDOCAINE HYDROCHLORIDE 20 MG/ML
INJECTION, SOLUTION EPIDURAL; INFILTRATION; INTRACAUDAL; PERINEURAL
Status: DISCONTINUED | OUTPATIENT
Start: 2025-05-21 | End: 2025-05-21 | Stop reason: SDUPTHER

## 2025-05-21 RX ORDER — MIDAZOLAM HYDROCHLORIDE 1 MG/ML
INJECTION, SOLUTION INTRAMUSCULAR; INTRAVENOUS
Status: DISCONTINUED | OUTPATIENT
Start: 2025-05-21 | End: 2025-05-21 | Stop reason: SDUPTHER

## 2025-05-21 RX ADMIN — MIDAZOLAM 2 MG: 1 INJECTION INTRAMUSCULAR; INTRAVENOUS at 09:51

## 2025-05-21 RX ADMIN — SODIUM CHLORIDE, SODIUM LACTATE, POTASSIUM CHLORIDE, AND CALCIUM CHLORIDE: .6; .31; .03; .02 INJECTION, SOLUTION INTRAVENOUS at 08:35

## 2025-05-21 RX ADMIN — OXYCODONE HYDROCHLORIDE 5 MG: 5 TABLET ORAL at 11:56

## 2025-05-21 RX ADMIN — CEFAZOLIN 2 G: 1 INJECTION, POWDER, FOR SOLUTION INTRAMUSCULAR; INTRAVENOUS at 09:56

## 2025-05-21 RX ADMIN — PROPOFOL 100 MCG/KG/MIN: 10 INJECTION, EMULSION INTRAVENOUS at 11:00

## 2025-05-21 RX ADMIN — LIDOCAINE HYDROCHLORIDE 80 MG: 20 INJECTION, SOLUTION EPIDURAL; INFILTRATION; INTRACAUDAL; PERINEURAL at 09:55

## 2025-05-21 RX ADMIN — PROPOFOL 75 MCG/KG/MIN: 10 INJECTION, EMULSION INTRAVENOUS at 09:55

## 2025-05-21 RX ADMIN — FENTANYL CITRATE 50 MCG: 50 INJECTION INTRAMUSCULAR; INTRAVENOUS at 10:38

## 2025-05-21 RX ADMIN — FENTANYL CITRATE 50 MCG: 50 INJECTION INTRAMUSCULAR; INTRAVENOUS at 10:16

## 2025-05-21 RX ADMIN — PROPOFOL 100 MCG/KG/MIN: 10 INJECTION, EMULSION INTRAVENOUS at 10:36

## 2025-05-21 RX ADMIN — FENTANYL CITRATE 50 MCG: 50 INJECTION INTRAMUSCULAR; INTRAVENOUS at 09:55

## 2025-05-21 ASSESSMENT — PAIN DESCRIPTION - DESCRIPTORS
DESCRIPTORS: ACHING
DESCRIPTORS: ACHING
DESCRIPTORS: BURNING

## 2025-05-21 ASSESSMENT — PAIN DESCRIPTION - LOCATION
LOCATION: OTHER (COMMENT)

## 2025-05-21 ASSESSMENT — PAIN DESCRIPTION - PAIN TYPE
TYPE: SURGICAL PAIN

## 2025-05-21 ASSESSMENT — PAIN DESCRIPTION - ORIENTATION
ORIENTATION: LEFT

## 2025-05-21 ASSESSMENT — PAIN SCALES - GENERAL
PAINLEVEL_OUTOF10: 4
PAINLEVEL_OUTOF10: 5
PAINLEVEL_OUTOF10: 5

## 2025-05-21 ASSESSMENT — PAIN - FUNCTIONAL ASSESSMENT: PAIN_FUNCTIONAL_ASSESSMENT: 0-10

## 2025-05-21 NOTE — H&P
History and Physical Update    Pt Name: Zainab Tony  MRN: [id number]  YOB: 1955  Date of evaluation: 5/21/2025      [x] I have reviewed the office note found in Epic by Dr. Celeste Prajapati dated 5/13/2025 used for an Interval History and Physical note.     [x] I have examined Zainab Tony a 69 y.o., female who is scheduled for an AXILLARY LYMPH NODE BIOPSY by Celeste Frias DO due to History of B-cell lymphoma. The patient denies health changes since her appointment with Dr. Celeste Prajapati on 5/13/2025. Pt denies chills, productive cough, SOB, chest pain, rashes, and history of diabetes. 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 Pt states the pyoderma occasionally bleeds. Pt has had daily fevers no higher than 100.8 F for the past 4-6 weeks. Pt had a fever yesterday but does not have a fever at this time. Pt finished a 7 day course of an antibiotic on 5/17/2025 for the fevers. Pt has been putting ice packs on her chest and forehead to reduce the fever. Jennifer Sood RN stated that she called Dr. Prajapati to inform her of the pt's recent fevers and pyoderma which is currently closed. Jennifer stated Dr. Prajapati said the pt may proceed with surgery today as planned. Aspirin was last taken on 5/14/2025. Plaquenil, vitamin B-12, Iron 325, folic acid, COQ10, Calcium 500, potassium chloride, multiple vitamins-minerals tablet, and vitamin C were last taken on 5/20/2025.     Latest Reference Range & Units Most Recent   WBC 3.5 - 11.3 k/uL 3.6  5/21/25 08:38   RBC 3.95 - 5.11 m/uL 3.63 (L)  5/21/25 08:38   Hemoglobin Quant 11.9 - 15.1 g/dL 13.0  5/21/25 08:38   Hematocrit 36.3 - 47.1 % 36.8  5/21/25 08:38   MCV 82.6 - 102.9 fL 101.4  5/21/25 08:38   MCH 25.2 - 33.5 pg 35.8 (H)  5/21/25 08:38   MCHC 28.4 - 34.8 g/dL 35.3 (H)  5/21/25 08:38   MPV 8.1 - 13.5 fL 10.0  5/21/25 08:38   RDW 11.8 - 14.4 % 13.3  5/21/25 08:38   RDW-SD (37.0  - 49.0) fl 51.4 (H)  5/7/25 12:34   Platelet Count 138 -  MG tablet Take 1 tablet by mouth daily (with breakfast)   Yes Agustin Mackenzie MD   gabapentin (NEURONTIN) 300 MG capsule Take 2 capsules by mouth daily.   Yes Agustin Mackenzie MD   loratadine (CLARITIN) 10 MG tablet Take 1 tablet by mouth daily   Yes Agustin Mackenzie MD   solifenacin (VESICARE) 10 MG tablet Take 1 tablet by mouth daily   Yes Agustin Mackenzie MD   tiZANidine (ZANAFLEX) 4 MG tablet Take 1 tablet by mouth 5/8/18  Yes Agustin Mackenzie MD   Multiple Vitamins-Minerals (THERAPEUTIC MULTIVITAMIN-MINERALS) tablet Take 1 tablet by mouth daily   Yes Agustin Mackenzie MD   famotidine (PEPCID) 20 MG tablet Take am of procedure with sip of water 4/9/14  Yes Hubert Reis MD   fluticasone-salmeterol (ADVAIR) 250-50 MCG/DOSE AEPB Inhale 1 puff into the lungs in the morning and 1 puff in the evening. Take as directed .   Yes Agustin Mackenzie MD   buPROPion (WELLBUTRIN XL) 150 MG XL tablet Take 2 tablets by mouth every morning   Yes Agustin Mackenzie MD   montelukast (SINGULAIR) 10 MG tablet Take 1 tablet by mouth as needed   Yes Agustin Mackenzie MD   levothyroxine (SYNTHROID) 175 MCG tablet Take 1 tablet by mouth Daily   Yes Agustin Mackenzie MD   topiramate (TOPAMAX) 25 MG tablet Take 2 tablets by mouth 2 times daily 50 mg every 12 hours   Yes Agustin Mackenzie MD       This is a 69 y.o. female who is pleasant, cooperative, alert and oriented x 3, in no acute distress. Obese.     Heart: Regular rate and rhythm without murmur, gallop, or rub.   Lungs: Normal respiratory effort, unlabored, and clear to auscultation without wheezes or rales bilaterally.   Abdomen: Soft, nontender, nondistended with active bowel sounds.   Skin: Adhesive bandage applied at home was loosely adhered to the sacrum. Sacrum has mild erythema but no lesions noted under the bandage. The bandage is dry but one adhesive edge of the dressing is minimally soiled. The bandage was

## 2025-05-21 NOTE — ANESTHESIA POSTPROCEDURE EVALUATION
Department of Anesthesiology  Postprocedure Note    Patient: Zainab Tony  MRN: [id number]  YOB: 1955  Date of evaluation: 5/21/2025    Procedure Summary       Date: 05/21/25 Room / Location: [address]    Anesthesia Start: 0951 Anesthesia Stop: 1120    Procedure: AXILLARY LYMPH NODE BIOPSY (Left) Diagnosis:       History of B-cell lymphoma      (History of B-cell lymphoma [Z85.72])    Surgeons: Celeste Prajapati DO Responsible Provider: Jeremie Sparks MD    Anesthesia Type: MAC ASA Status: 3            Anesthesia Type: No value filed.    Arlette Phase I: Arlette Score: 10    Arlette Phase II: Arlette Score: 10    Anesthesia Post Evaluation    Patient location during evaluation: PACU  Patient participation: complete - patient participated  Level of consciousness: awake and alert  Airway patency: patent  Nausea & Vomiting: no nausea and no vomiting  Cardiovascular status: hemodynamically stable  Respiratory status: acceptable  Hydration status: euvolemic  Pain management: adequate    No notable events documented.

## 2025-05-21 NOTE — DISCHARGE INSTRUCTIONS
Patient Discharge Instructions  Surgery Patient Discharge Instructions    RESUME ACTIVITY:     WOUND CARE:   Do not remove top dressing for 24 hrs.    Skin glue used. Do not peel off, allow to fall off naturally; if glue has not fallen off within 10-14 days, OK to gently remove.     BATHING:  Ok to shower 24 hours after surgery and remove the top dressing. Do not submerge surgical incisions in water (baths, pools, hot tubs, lakes) until cleared by surgeon at post operative follow up visit.    DRIVING: No driving for while on pain medication    RETURN TO WORK:  No repetitive motion with your left arm for 1 week to allow your incision to heal    WALKING:    Yes    LIFTING: Avoid lifting objects heavier than 20 lbs for 1 weeks with your left arm.    DIET:   Ok to resume regular diet.     MEDICATIONS: Take medications as prescribed. For adequate pain control, take tylenol and motrin in alternating cycles (e.g. take 500mg tylenol at 8am, take 400mg motrin at 12pm, take 500mg tylenol at 4pm, take 400mg motrin at 8pm, etc.). If prescribed narcotic medications (Norco, Percocet, or Roxicodone), use for breakthrough pain and attempt to wean off medications as soon as possible. Do not take more than 4000mg tylenol in 24 hours.     Take colace 100mg up to twice a day or Miralax 17g daily until you achieve a bowel movement. If you do not have a bowel movement for 3 days after surgery, take magnesium citrate (over the counter) daily.    FOLLOW UP: Call [phone] for follow up appointment with Dr. Prajapati in 10-14 days if one has not already been made    SPECIAL INSTRUCTIONS:  After you leave the hospital, call your doctor if any of the following occurs:   Pain or symptoms that worsen   Other new symptoms   Signs of infection, including fever and chills   Nausea and/or vomiting that you can't control with the medications you were given   Pain that you can't control with the medications you've been given   Excessive tenderness or

## 2025-05-21 NOTE — ANESTHESIA PRE PROCEDURE
Department of Anesthesiology  Preprocedure Note       Name:  Zainab Tony   Age:  69 y.o.  :  1955                                          MRN:  [id number]         Date:  2025      Surgeon: Surgeon(s):  Celeste Prajapati DO    Procedure: Procedure(s):  AXILLARY LYMPH NODE BIOPSY    Medications prior to admission:   Prior to Admission medications    Medication Sig Start Date End Date Taking? Authorizing Provider   alendronate (FOSAMAX) 70 MG tablet Take 1 tablet by mouth every 7 days   Yes Agustin Mackenzie MD   mycophenolate (CELLCEPT) 500 MG tablet Take 2 tablets by mouth 2 times daily   Yes Agustin Mackenzie MD   hydroxychloroquine (PLAQUENIL) 200 MG tablet Take 1 tablet by mouth 2 times daily   Yes Agustin Mackenzie MD   dicyclomine (BENTYL) 10 MG capsule Take 1 capsule by mouth in the morning, at noon, and at bedtime   Yes Agustin aMckenzie MD   potassium chloride (MICRO-K) 10 MEQ extended release capsule Take 1 capsule by mouth 2 times daily   Yes Agustin Mackenzie MD   pantoprazole (PROTONIX) 40 MG tablet Take 1 tablet by mouth 2 times daily   Yes Agustin Mackenzie MD   aspirin 81 MG EC tablet Take 1 tablet by mouth daily   Yes Agustin Mackenzie MD   folic acid (FOLVITE) 1 MG tablet Take 1 tablet by mouth daily   Yes Agustin Mackenzie MD   acyclovir (ZOVIRAX) 400 MG tablet Take 1 tablet by mouth 2 times daily   Yes Agustin Mackenzie MD   Coenzyme Q10 (COQ10 PO) Take 1 tablet by mouth daily   Yes Agustin Mackenzie MD   VITAMIN C, CALCIUM ASCORBATE, PO Take 1 tablet by mouth daily   Yes Agustin Mackenzie MD   Cyanocobalamin (VITAMIN B-12 PO) Take 1 tablet by mouth daily   Yes Agustin Mackenzie MD   Calcium Carbonate (CALCIUM 500 PO) Take 1 tablet by mouth daily   Yes Agustin Mackenzie MD   ferrous sulfate (IRON 325) 325 (65 Fe) MG tablet Take 1 tablet by mouth daily (with breakfast)   Yes Agustin Mackenzie MD   gabapentin (NEURONTIN) 300 MG

## 2025-05-21 NOTE — OP NOTE
Operative Note      Patient: Zainab Tony  YOB: 1955  MRN: [id number]    Date of Procedure: 5/21/2025    Pre-Op Diagnosis Codes:      * History of B-cell lymphoma [Z85.72]    Post-Op Diagnosis: Same       Procedure(s):  AXILLARY LYMPH NODE BIOPSY LEFT    Surgeon(s):  Celeste Prajapati DO    Assistant:   Surgical Assistant: Indira Pickard    Anesthesia: Monitor Anesthesia Care    Estimated Blood Loss (mL): Minimal    Complications: None    Specimens:   ID Type Source Tests Collected by Time Destination   A : LEFT AXILLARY LYMPH NODES Tissue Axillary SURGICAL PATHOLOGY Celeste Prajapati DO 5/21/2025 1010        Implants:  * No implants in log *      Drains: * No LDAs found *    Findings:  Infection Present At Time Of Surgery (PATOS) (choose all levels that have infection present):  No infection present  Other Findings: 2 cm left axillary lymph node to rule out diffuse large B-cell lymphoma  This procedure was not performed to treat cancer     Detailed Description of Procedure:   Patient is a 69-year-old female with a prior history of a diffuse large B-cell lymphoma treated with chemotherapy and now with new lymphadenopathy where core 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  However, given patient's personal history of diffuse large B-cell lymphoma, excisional left axillary lymph node biopsy was recommended.  Risks including infection, bleeding, injury to surrounding structures, need for more procedures, chronic pain, scarring and risk of anesthesia were explained to the patient.  Informed consent was signed.  Patient was brought to the operating room and placed in a supine position on the operating table.  She underwent monitored anesthesia care.  Patient received 2 g of Ancef for perioperative antibiotics.  A timeout was performed confirming the patient, procedure, allergies and all the concerns.  The left chest and axilla were prepped with Hibiclens and draped in the usual sterile fashion.  An ultrasound  was used to identify the 2 cm enlarged lymph node within the left axilla.  0.5% Marcaine with epinephrine was used to locally anesthetize the skin and surrounding tissue.  An incision was made using a #15 blade approximately 5 cm in length.  Further dissection was carried down using Bovie electrocautery.  The ultrasound was used again to identify the lymph node as it was deep and abutting the chest wall.  The lymph node was grasped with an Allis and circumferentially dissected free using blunt dissection as well as electrocautery.  Care was taken to avoid any injury to surrounding nerves. Excellent hemostasis was achieved.  There was additionally a smaller lymph node just deep and posterior which was also grasped and partially resected.  Both specimens were sent for pathology.  The wound cavity was thoroughly irrigated with warm normal saline solution.  Excellent hemostasis was achieved with electrocautery.  The pectoralis major fascia was gently approximated to the deeper fascia with 3-0 Vicryl suture in interrupted fashion.  The wound was then closed in layers and the skin was closed with 4-0 Monocryl in a running subcuticular fashion.  Skin glue was applied followed by sterile dressing.  This concluded the procedure.  All sponge, needle instrument counts were correct in the case.  Patient tolerated the procedure well and was transferred to PACU in stable condition.    Electronically signed by Celeste Prajapati DO on 5/21/2025 at 11:12 AM

## 2025-05-27 LAB — SURGICAL PATHOLOGY REPORT: NORMAL

## (undated) DEVICE — GAUZE,SPONGE,FLUFF,6"X6.75",STRL,5/TRAY: Brand: MEDLINE

## (undated) DEVICE — DRAIN SURG W10XL20CM SIL SMOOTH FLAT 3/4 PERF DBL WRP

## (undated) DEVICE — SKIN PREP TRAY W/CHG: Brand: MEDLINE INDUSTRIES, INC.

## (undated) DEVICE — RESERVOIR,SUCTION,100CC,SILICONE: Brand: MEDLINE

## (undated) DEVICE — GLOVE SURG SZ 6 CRM LTX FREE POLYISOPRENE POLYMER BEAD ANTI

## (undated) DEVICE — GLOVE SURG SZ 65 CRM LTX FREE POLYISOPRENE POLYMER BEAD ANTI

## (undated) DEVICE — STAZ MAJOR PLASTIC: Brand: MEDLINE INDUSTRIES, INC.

## (undated) DEVICE — ELECTRODE PT RET AD L9FT HI MOIST COND ADH HYDRGEL CORDED

## (undated) DEVICE — 4-PORT MANIFOLD: Brand: NEPTUNE 2

## (undated) DEVICE — SUTURE MONOCRYL SZ 4-0 L27IN ABSRB UD L19MM PS-2 1/2 CIR PRIM Y426H